# Patient Record
Sex: FEMALE | Race: WHITE | NOT HISPANIC OR LATINO | Employment: OTHER | URBAN - METROPOLITAN AREA
[De-identification: names, ages, dates, MRNs, and addresses within clinical notes are randomized per-mention and may not be internally consistent; named-entity substitution may affect disease eponyms.]

---

## 2023-03-31 ENCOUNTER — OFFICE VISIT (OUTPATIENT)
Dept: PRIMARY CARE CLINIC | Facility: CLINIC | Age: 78
End: 2023-03-31
Payer: MEDICARE

## 2023-03-31 VITALS
WEIGHT: 108.69 LBS | OXYGEN SATURATION: 99 % | SYSTOLIC BLOOD PRESSURE: 104 MMHG | HEIGHT: 61 IN | HEART RATE: 81 BPM | BODY MASS INDEX: 20.52 KG/M2 | RESPIRATION RATE: 18 BRPM | DIASTOLIC BLOOD PRESSURE: 82 MMHG

## 2023-03-31 DIAGNOSIS — E03.9 HYPOTHYROIDISM, UNSPECIFIED TYPE: ICD-10-CM

## 2023-03-31 DIAGNOSIS — I73.9 PERIPHERAL VASCULAR DISEASE, UNSPECIFIED: ICD-10-CM

## 2023-03-31 DIAGNOSIS — I25.10 CORONARY ARTERY DISEASE INVOLVING NATIVE CORONARY ARTERY OF NATIVE HEART WITHOUT ANGINA PECTORIS: Primary | ICD-10-CM

## 2023-03-31 DIAGNOSIS — Z12.11 COLON CANCER SCREENING: ICD-10-CM

## 2023-03-31 DIAGNOSIS — G47.00 INSOMNIA, UNSPECIFIED TYPE: ICD-10-CM

## 2023-03-31 DIAGNOSIS — E78.01 FAMILIAL HYPERCHOLESTEROLEMIA: ICD-10-CM

## 2023-03-31 DIAGNOSIS — I10 ESSENTIAL HYPERTENSION: ICD-10-CM

## 2023-03-31 DIAGNOSIS — D69.2 OTHER NONTHROMBOCYTOPENIC PURPURA: ICD-10-CM

## 2023-03-31 PROBLEM — E78.49 FAMILIAL HYPERLIPIDEMIA: Status: ACTIVE | Noted: 2023-03-31

## 2023-03-31 PROBLEM — E78.49 FAMILIAL HYPERLIPIDEMIA: Status: RESOLVED | Noted: 2023-03-31 | Resolved: 2023-03-31

## 2023-03-31 PROCEDURE — 1160F PR REVIEW ALL MEDS BY PRESCRIBER/CLIN PHARMACIST DOCUMENTED: ICD-10-PCS | Mod: CPTII,S$GLB,, | Performed by: FAMILY MEDICINE

## 2023-03-31 PROCEDURE — 3079F DIAST BP 80-89 MM HG: CPT | Mod: CPTII,S$GLB,, | Performed by: FAMILY MEDICINE

## 2023-03-31 PROCEDURE — 99999 PR PBB SHADOW E&M-EST. PATIENT-LVL IV: ICD-10-PCS | Mod: PBBFAC,,, | Performed by: FAMILY MEDICINE

## 2023-03-31 PROCEDURE — 1160F RVW MEDS BY RX/DR IN RCRD: CPT | Mod: CPTII,S$GLB,, | Performed by: FAMILY MEDICINE

## 2023-03-31 PROCEDURE — 1100F PTFALLS ASSESS-DOCD GE2>/YR: CPT | Mod: CPTII,S$GLB,, | Performed by: FAMILY MEDICINE

## 2023-03-31 PROCEDURE — 3079F PR MOST RECENT DIASTOLIC BLOOD PRESSURE 80-89 MM HG: ICD-10-PCS | Mod: CPTII,S$GLB,, | Performed by: FAMILY MEDICINE

## 2023-03-31 PROCEDURE — 99499 UNLISTED E&M SERVICE: CPT | Mod: HCNC,S$GLB,, | Performed by: FAMILY MEDICINE

## 2023-03-31 PROCEDURE — 99204 PR OFFICE/OUTPT VISIT, NEW, LEVL IV, 45-59 MIN: ICD-10-PCS | Mod: S$GLB,,, | Performed by: FAMILY MEDICINE

## 2023-03-31 PROCEDURE — 99204 OFFICE O/P NEW MOD 45 MIN: CPT | Mod: S$GLB,,, | Performed by: FAMILY MEDICINE

## 2023-03-31 PROCEDURE — 3288F PR FALLS RISK ASSESSMENT DOCUMENTED: ICD-10-PCS | Mod: CPTII,S$GLB,, | Performed by: FAMILY MEDICINE

## 2023-03-31 PROCEDURE — 3074F PR MOST RECENT SYSTOLIC BLOOD PRESSURE < 130 MM HG: ICD-10-PCS | Mod: CPTII,S$GLB,, | Performed by: FAMILY MEDICINE

## 2023-03-31 PROCEDURE — 1100F PR PT FALLS ASSESS DOC 2+ FALLS/FALL W/INJURY/YR: ICD-10-PCS | Mod: CPTII,S$GLB,, | Performed by: FAMILY MEDICINE

## 2023-03-31 PROCEDURE — 1126F AMNT PAIN NOTED NONE PRSNT: CPT | Mod: CPTII,S$GLB,, | Performed by: FAMILY MEDICINE

## 2023-03-31 PROCEDURE — 1159F MED LIST DOCD IN RCRD: CPT | Mod: CPTII,S$GLB,, | Performed by: FAMILY MEDICINE

## 2023-03-31 PROCEDURE — 3288F FALL RISK ASSESSMENT DOCD: CPT | Mod: CPTII,S$GLB,, | Performed by: FAMILY MEDICINE

## 2023-03-31 PROCEDURE — 1159F PR MEDICATION LIST DOCUMENTED IN MEDICAL RECORD: ICD-10-PCS | Mod: CPTII,S$GLB,, | Performed by: FAMILY MEDICINE

## 2023-03-31 PROCEDURE — 99999 PR PBB SHADOW E&M-EST. PATIENT-LVL IV: CPT | Mod: PBBFAC,,, | Performed by: FAMILY MEDICINE

## 2023-03-31 PROCEDURE — 3074F SYST BP LT 130 MM HG: CPT | Mod: CPTII,S$GLB,, | Performed by: FAMILY MEDICINE

## 2023-03-31 PROCEDURE — 1126F PR PAIN SEVERITY QUANTIFIED, NO PAIN PRESENT: ICD-10-PCS | Mod: CPTII,S$GLB,, | Performed by: FAMILY MEDICINE

## 2023-03-31 RX ORDER — ALPRAZOLAM 0.5 MG/1
0.25 TABLET ORAL NIGHTLY PRN
Qty: 20 TABLET | Refills: 2 | Status: SHIPPED | OUTPATIENT
Start: 2023-03-31 | End: 2023-06-30

## 2023-03-31 NOTE — ASSESSMENT & PLAN NOTE
Previous diagnosis found in chart.  Patient denies any problems or active symptoms.  No claudication.  Records have been requested.

## 2023-03-31 NOTE — PROGRESS NOTES
THIS DOCUMENT WAS MADE IN PART WITH VOICE RECOGNITION SOFTWARE.  OCCASIONALLY THIS SOFTWARE WILL MISINTERPRET WORDS OR PHRASES.      Primary Care Provider Appointment   [facility] [place]       Patient ID: Medina Hameed is a 77 y.o. female.    ASSESSMENT/PLAN by Problem List:  1. Coronary artery disease involving native coronary artery of native heart without angina pectoris  Overview:  She reports history of coronary artery disease, status post stent placement.  She follows closely with Dr. Harry Madrigal.  She denies any angina or acute symptoms.      2. Essential hypertension  Overview:  Chronic condition appears stable and well controlled.  Currently she is on:  Amlodipine 10 mg daily  Triamterene hydrochlorothiazide 75-50 mg, 1/2 tablet daily    Blood pressure today was slightly low but she denies any lightheadedness or dizziness is states pressure normally runs around 112-120 systolic.  She will continue to monitor.    Even though she is on a potassium-sparing diuretic apparently she still requires supplemental potassium.  We are requesting records.      3. Other nonthrombocytopenic purpura  Overview:  Patient does have very easy bruising on the extremities upper and lower.  She is on aspirin which is exacerbating this.  No recent labs.  Will request and if needed add a CBC to check platelet count.      4. Peripheral vascular disease, unspecified  Assessment & Plan:  Previous diagnosis found in chart.  Patient denies any problems or active symptoms.  No claudication.  Records have been requested.      5. Hypothyroidism, unspecified type  Overview:  Patient is currently on levothyroxine 88 mcg daily.  No recent labs, will request labs and order additional if needed      6. Familial hypercholesterolemia  Overview:  History of cholesterol levels approaching 400.  She is currently on Repatha and Zetia.  Apparently she did not do well on rosuvastatin      7. Insomnia, unspecified  type  Overview:  Patient reports a longstanding history of insomnia for which she takes alprazolam 5 mg.  Although recently she has only been taking half a tablet because she could no longer get a prescription as she has not had a PCP in a while.  Discussed benzodiazepine risk and use over time.  I would like for her to continue try to work on cutting back.  She agrees to remain on 1/2 tablet and continue to try to cut back further over time.      8. Colon cancer screening  Overview:  She reports she had a colonoscopy in January of 2022.  She states it was clear and was told she did not need to repeat this again.      Other orders  -     ALPRAZolam (XANAX) 0.5 MG tablet; Take 0.5 tablets (0.25 mg total) by mouth nightly as needed for Anxiety.  Dispense: 20 tablet; Refill: 2        Follow Up:Three months    Subjective:     Chief Complaint   Patient presents with    Establish Care     I have reviewed the information entered by the ancillary staff regarding the chief complaint as well as the related history.    HPI    Patient is a/an 77 y.o.  female     Establish care.  Limited history but reviewed as much as possible and discussed with her.  We are also requesting records from her cardiologist who has been managing most of her care.  She is not seen a PCP in awhile.  See above for details addressed today    For complete problem list, past medical history, surgical history, social history, etc., see appropriate section in the electronic medical record    Review of Systems   HENT: Negative.     Respiratory: Negative.     Cardiovascular: Negative.    Gastrointestinal: Negative.    Genitourinary: Negative.    Musculoskeletal: Negative.    Psychiatric/Behavioral:  Positive for sleep disturbance. The patient is nervous/anxious.      Objective     Physical Exam  Vitals reviewed.   Constitutional:       General: She is not in acute distress.     Appearance: She is well-developed. She is not toxic-appearing.   HENT:      Head:  "Normocephalic and atraumatic.      Right Ear: Tympanic membrane, ear canal and external ear normal. There is no impacted cerumen.      Left Ear: Tympanic membrane, ear canal and external ear normal. There is no impacted cerumen.      Nose: Nose normal.      Mouth/Throat:      Pharynx: Oropharynx is clear. No oropharyngeal exudate.   Eyes:      General: No scleral icterus.     Conjunctiva/sclera: Conjunctivae normal.      Pupils: Pupils are equal, round, and reactive to light.   Neck:      Thyroid: No thyromegaly.      Vascular: No JVD.      Trachea: No tracheal deviation.   Cardiovascular:      Rate and Rhythm: Normal rate and regular rhythm.      Chest Wall: PMI is not displaced.      Heart sounds: Normal heart sounds. No murmur heard.  Pulmonary:      Effort: No respiratory distress.      Breath sounds: Normal breath sounds. No wheezing or rales.   Abdominal:      Palpations: Abdomen is soft.   Musculoskeletal:      Cervical back: Normal range of motion and neck supple.   Lymphadenopathy:      Cervical: No cervical adenopathy.   Skin:     Coloration: Skin is not jaundiced or pale.   Neurological:      Mental Status: She is alert and oriented to person, place, and time.      Cranial Nerves: No cranial nerve deficit.      Motor: No abnormal muscle tone.      Deep Tendon Reflexes: Reflexes normal.   Psychiatric:         Behavior: Behavior normal.     Vitals:    03/31/23 0754   BP: 104/82   BP Location: Left arm   Patient Position: Sitting   BP Method: Medium (Manual)   Pulse: 81   Resp: 18   SpO2: 99%   Weight: 49.3 kg (108 lb 11 oz)   Height: 5' 1" (1.549 m)       "

## 2023-06-30 ENCOUNTER — OFFICE VISIT (OUTPATIENT)
Dept: PRIMARY CARE CLINIC | Facility: CLINIC | Age: 78
End: 2023-06-30
Payer: MEDICARE

## 2023-06-30 VITALS
SYSTOLIC BLOOD PRESSURE: 112 MMHG | HEIGHT: 57 IN | BODY MASS INDEX: 23.71 KG/M2 | DIASTOLIC BLOOD PRESSURE: 70 MMHG | TEMPERATURE: 98 F | OXYGEN SATURATION: 97 % | WEIGHT: 109.88 LBS | HEART RATE: 64 BPM | RESPIRATION RATE: 18 BRPM

## 2023-06-30 DIAGNOSIS — G47.00 INSOMNIA, UNSPECIFIED TYPE: ICD-10-CM

## 2023-06-30 PROCEDURE — 3074F SYST BP LT 130 MM HG: CPT | Mod: CPTII,S$GLB,, | Performed by: FAMILY MEDICINE

## 2023-06-30 PROCEDURE — 1159F MED LIST DOCD IN RCRD: CPT | Mod: CPTII,S$GLB,, | Performed by: FAMILY MEDICINE

## 2023-06-30 PROCEDURE — 3078F PR MOST RECENT DIASTOLIC BLOOD PRESSURE < 80 MM HG: ICD-10-PCS | Mod: CPTII,S$GLB,, | Performed by: FAMILY MEDICINE

## 2023-06-30 PROCEDURE — 1126F AMNT PAIN NOTED NONE PRSNT: CPT | Mod: CPTII,S$GLB,, | Performed by: FAMILY MEDICINE

## 2023-06-30 PROCEDURE — 1160F PR REVIEW ALL MEDS BY PRESCRIBER/CLIN PHARMACIST DOCUMENTED: ICD-10-PCS | Mod: CPTII,S$GLB,, | Performed by: FAMILY MEDICINE

## 2023-06-30 PROCEDURE — 3288F FALL RISK ASSESSMENT DOCD: CPT | Mod: CPTII,S$GLB,, | Performed by: FAMILY MEDICINE

## 2023-06-30 PROCEDURE — 1126F PR PAIN SEVERITY QUANTIFIED, NO PAIN PRESENT: ICD-10-PCS | Mod: CPTII,S$GLB,, | Performed by: FAMILY MEDICINE

## 2023-06-30 PROCEDURE — 1159F PR MEDICATION LIST DOCUMENTED IN MEDICAL RECORD: ICD-10-PCS | Mod: CPTII,S$GLB,, | Performed by: FAMILY MEDICINE

## 2023-06-30 PROCEDURE — 3288F PR FALLS RISK ASSESSMENT DOCUMENTED: ICD-10-PCS | Mod: CPTII,S$GLB,, | Performed by: FAMILY MEDICINE

## 2023-06-30 PROCEDURE — 99214 PR OFFICE/OUTPT VISIT, EST, LEVL IV, 30-39 MIN: ICD-10-PCS | Mod: S$GLB,,, | Performed by: FAMILY MEDICINE

## 2023-06-30 PROCEDURE — 1160F RVW MEDS BY RX/DR IN RCRD: CPT | Mod: CPTII,S$GLB,, | Performed by: FAMILY MEDICINE

## 2023-06-30 PROCEDURE — 1101F PR PT FALLS ASSESS DOC 0-1 FALLS W/OUT INJ PAST YR: ICD-10-PCS | Mod: CPTII,S$GLB,, | Performed by: FAMILY MEDICINE

## 2023-06-30 PROCEDURE — 3074F PR MOST RECENT SYSTOLIC BLOOD PRESSURE < 130 MM HG: ICD-10-PCS | Mod: CPTII,S$GLB,, | Performed by: FAMILY MEDICINE

## 2023-06-30 PROCEDURE — 3078F DIAST BP <80 MM HG: CPT | Mod: CPTII,S$GLB,, | Performed by: FAMILY MEDICINE

## 2023-06-30 PROCEDURE — 99999 PR PBB SHADOW E&M-EST. PATIENT-LVL V: ICD-10-PCS | Mod: PBBFAC,,, | Performed by: FAMILY MEDICINE

## 2023-06-30 PROCEDURE — 1101F PT FALLS ASSESS-DOCD LE1/YR: CPT | Mod: CPTII,S$GLB,, | Performed by: FAMILY MEDICINE

## 2023-06-30 PROCEDURE — 99999 PR PBB SHADOW E&M-EST. PATIENT-LVL V: CPT | Mod: PBBFAC,,, | Performed by: FAMILY MEDICINE

## 2023-06-30 PROCEDURE — 99214 OFFICE O/P EST MOD 30 MIN: CPT | Mod: S$GLB,,, | Performed by: FAMILY MEDICINE

## 2023-06-30 RX ORDER — TRIAMTERENE/HYDROCHLOROTHIAZID 37.5-25 MG
1 TABLET ORAL DAILY
COMMUNITY

## 2023-06-30 RX ORDER — L-METHYLFOLATE-ALGAE-VIT B12-B6 CAP 3-90.314-2-35 MG 3-90.314-2-35 MG
CAP ORAL
COMMUNITY
Start: 2023-03-30

## 2023-06-30 RX ORDER — ALPRAZOLAM 0.5 MG/1
0.5 TABLET ORAL NIGHTLY PRN
Qty: 30 TABLET | Refills: 3 | Status: SHIPPED | OUTPATIENT
Start: 2023-06-30 | End: 2023-10-02 | Stop reason: SDUPTHER

## 2023-06-30 RX ORDER — METOPROLOL SUCCINATE 25 MG/1
TABLET, EXTENDED RELEASE ORAL
COMMUNITY

## 2023-06-30 NOTE — PROGRESS NOTES
Primary Care Provider Appointment   [facility] [place]       Patient ID: Medina Hameed is a 77 y.o. female.    ASSESSMENT/PLAN by Problem List:    1. Insomnia, unspecified type  Overview:  Patient reports a longstanding history of insomnia for which she takes alprazolam 0.5 mg.  Although recently she has only been taking half a tablet because she could no longer get a prescription as she has not had a PCP in a while.  Discussed benzodiazepine risk and use over time.  I had recommended that she reduce to 1/2 tablet.  She has tried this but states she is not sleeping well and really wants to go back to a full 0.5 mg tablet.  New prescription given.  She will follow back in 3-4 months.      Other orders  -     ALPRAZolam (XANAX) 0.5 MG tablet; Take 1 tablet (0.5 mg total) by mouth nightly as needed for Anxiety.  Dispense: 30 tablet; Refill: 3        Follow Up:  3-4 months    Subjective:     Chief Complaint   Patient presents with    Follow-up     I have reviewed the information entered by the ancillary staff regarding the chief complaint as well as the related history.    HPI    Patient is a/an 77 y.o.  female       Recent angiogram, all normal, med changes, feels better but not sleeping well  Not sleeping with reduce xanax dose  Muscle tightness in neck    For complete problem list, past medical history, surgical history, social history, etc., see appropriate section in the electronic medical record    Review of Systems   Respiratory: Negative.     Cardiovascular: Negative.    Gastrointestinal: Negative.    Genitourinary: Negative.    Musculoskeletal:  Positive for neck stiffness.   Psychiatric/Behavioral:  Positive for sleep disturbance. Negative for dysphoric mood.      Objective     Physical Exam  Vitals reviewed.   Constitutional:       General: She is not in acute distress.     Appearance: She is well-developed. She is not diaphoretic.   HENT:      Head: Normocephalic and  "atraumatic.   Eyes:      General: No scleral icterus.  Pulmonary:      Effort: Pulmonary effort is normal. No respiratory distress.   Neurological:      Mental Status: She is alert and oriented to person, place, and time.   Psychiatric:         Mood and Affect: Mood normal.         Behavior: Behavior normal.     Vitals:    06/30/23 0915   BP: 112/70   BP Location: Left arm   Patient Position: Sitting   BP Method: Medium (Manual)   Pulse: 64   Resp: 18   Temp: 98.4 °F (36.9 °C)   TempSrc: Oral   SpO2: 97%   Weight: 49.9 kg (109 lb 14.4 oz)   Height: 4' 9" (1.448 m)       "

## 2023-10-02 ENCOUNTER — OFFICE VISIT (OUTPATIENT)
Dept: PRIMARY CARE CLINIC | Facility: CLINIC | Age: 78
End: 2023-10-02
Payer: MEDICARE

## 2023-10-02 VITALS
DIASTOLIC BLOOD PRESSURE: 70 MMHG | BODY MASS INDEX: 23.95 KG/M2 | HEIGHT: 57 IN | SYSTOLIC BLOOD PRESSURE: 112 MMHG | RESPIRATION RATE: 18 BRPM | HEART RATE: 59 BPM | OXYGEN SATURATION: 98 % | TEMPERATURE: 99 F | WEIGHT: 111 LBS

## 2023-10-02 DIAGNOSIS — Z78.0 POSTMENOPAUSAL: ICD-10-CM

## 2023-10-02 DIAGNOSIS — G47.00 INSOMNIA, UNSPECIFIED TYPE: ICD-10-CM

## 2023-10-02 DIAGNOSIS — I25.10 CORONARY ARTERY DISEASE INVOLVING NATIVE CORONARY ARTERY OF NATIVE HEART WITHOUT ANGINA PECTORIS: ICD-10-CM

## 2023-10-02 DIAGNOSIS — I10 ESSENTIAL HYPERTENSION: Primary | ICD-10-CM

## 2023-10-02 PROCEDURE — 1159F PR MEDICATION LIST DOCUMENTED IN MEDICAL RECORD: ICD-10-PCS | Mod: HCNC,CPTII,S$GLB, | Performed by: FAMILY MEDICINE

## 2023-10-02 PROCEDURE — 3074F SYST BP LT 130 MM HG: CPT | Mod: HCNC,CPTII,S$GLB, | Performed by: FAMILY MEDICINE

## 2023-10-02 PROCEDURE — 1160F PR REVIEW ALL MEDS BY PRESCRIBER/CLIN PHARMACIST DOCUMENTED: ICD-10-PCS | Mod: HCNC,CPTII,S$GLB, | Performed by: FAMILY MEDICINE

## 2023-10-02 PROCEDURE — 1160F RVW MEDS BY RX/DR IN RCRD: CPT | Mod: HCNC,CPTII,S$GLB, | Performed by: FAMILY MEDICINE

## 2023-10-02 PROCEDURE — 1126F AMNT PAIN NOTED NONE PRSNT: CPT | Mod: HCNC,CPTII,S$GLB, | Performed by: FAMILY MEDICINE

## 2023-10-02 PROCEDURE — 1126F PR PAIN SEVERITY QUANTIFIED, NO PAIN PRESENT: ICD-10-PCS | Mod: HCNC,CPTII,S$GLB, | Performed by: FAMILY MEDICINE

## 2023-10-02 PROCEDURE — 3078F PR MOST RECENT DIASTOLIC BLOOD PRESSURE < 80 MM HG: ICD-10-PCS | Mod: HCNC,CPTII,S$GLB, | Performed by: FAMILY MEDICINE

## 2023-10-02 PROCEDURE — 3288F FALL RISK ASSESSMENT DOCD: CPT | Mod: HCNC,CPTII,S$GLB, | Performed by: FAMILY MEDICINE

## 2023-10-02 PROCEDURE — 1101F PR PT FALLS ASSESS DOC 0-1 FALLS W/OUT INJ PAST YR: ICD-10-PCS | Mod: HCNC,CPTII,S$GLB, | Performed by: FAMILY MEDICINE

## 2023-10-02 PROCEDURE — 99214 OFFICE O/P EST MOD 30 MIN: CPT | Mod: HCNC,S$GLB,, | Performed by: FAMILY MEDICINE

## 2023-10-02 PROCEDURE — 3078F DIAST BP <80 MM HG: CPT | Mod: HCNC,CPTII,S$GLB, | Performed by: FAMILY MEDICINE

## 2023-10-02 PROCEDURE — 3288F PR FALLS RISK ASSESSMENT DOCUMENTED: ICD-10-PCS | Mod: HCNC,CPTII,S$GLB, | Performed by: FAMILY MEDICINE

## 2023-10-02 PROCEDURE — 1101F PT FALLS ASSESS-DOCD LE1/YR: CPT | Mod: HCNC,CPTII,S$GLB, | Performed by: FAMILY MEDICINE

## 2023-10-02 PROCEDURE — 99214 PR OFFICE/OUTPT VISIT, EST, LEVL IV, 30-39 MIN: ICD-10-PCS | Mod: HCNC,S$GLB,, | Performed by: FAMILY MEDICINE

## 2023-10-02 PROCEDURE — 1158F PR ADVANCE CARE PLANNING DISCUSS DOCUMENTED IN MEDICAL RECORD: ICD-10-PCS | Mod: HCNC,CPTII,S$GLB, | Performed by: FAMILY MEDICINE

## 2023-10-02 PROCEDURE — 99999 PR PBB SHADOW E&M-EST. PATIENT-LVL V: ICD-10-PCS | Mod: PBBFAC,HCNC,, | Performed by: FAMILY MEDICINE

## 2023-10-02 PROCEDURE — 3074F PR MOST RECENT SYSTOLIC BLOOD PRESSURE < 130 MM HG: ICD-10-PCS | Mod: HCNC,CPTII,S$GLB, | Performed by: FAMILY MEDICINE

## 2023-10-02 PROCEDURE — 99999 PR PBB SHADOW E&M-EST. PATIENT-LVL V: CPT | Mod: PBBFAC,HCNC,, | Performed by: FAMILY MEDICINE

## 2023-10-02 PROCEDURE — 1159F MED LIST DOCD IN RCRD: CPT | Mod: HCNC,CPTII,S$GLB, | Performed by: FAMILY MEDICINE

## 2023-10-02 PROCEDURE — 1158F ADVNC CARE PLAN TLK DOCD: CPT | Mod: HCNC,CPTII,S$GLB, | Performed by: FAMILY MEDICINE

## 2023-10-02 RX ORDER — ALPRAZOLAM 0.5 MG/1
0.5 TABLET ORAL NIGHTLY PRN
Qty: 30 TABLET | Refills: 3 | Status: SHIPPED | OUTPATIENT
Start: 2023-10-02 | End: 2024-01-11 | Stop reason: SDUPTHER

## 2023-10-02 NOTE — PROGRESS NOTES
Primary Care Provider Appointment   [facility] [place]       Patient ID: Medina Hameed is a 77 y.o. female.    ASSESSMENT/PLAN by Problem List:    1. Essential hypertension  Overview:  Chronic condition appears stable and well controlled.  Currently she is on:  Amlodipine 10 mg daily  Triamterene hydrochlorothiazide 75-50 mg, 1/2 tablet daily    Blood pressure today was slightly low but she denies any lightheadedness or dizziness is states pressure normally runs around 112-120 systolic.  She will continue to monitor.    Even though she is on a potassium-sparing diuretic apparently she still requires supplemental potassium.  We are requesting records.           Follow Up:  Three months    Health Maintenance         Date Due Completion Date    DEXA Scan Never done ---    COVID-19 Vaccine (6 - Mixed Product series) 03/09/2023 1/12/2023    Influenza Vaccine (1) 09/01/2023 10/24/2022    Lipid Panel 03/01/2028 3/1/2023    TETANUS VACCINE 05/03/2029 5/3/2019        Discussed recommended vaccinations.  They will think about this.    Advance Care Planning     Date: 10/02/2023    Living Will  Power of   I initiated the process of voluntary advance care planning today and explained the importance of this process to the patient.  I introduced the concept of advance directives to the patient, as well. Then the patient received detailed information about the importance of designating a Health Care Power of  (HCPOA). She was also instructed to communicate with this person about their wishes for future healthcare, should she become sick and lose decision-making capacity. The patient has not previously appointed a HCPOA. After our discussion, the patient has decided to complete a HCPOA and has appointed her  she will consider, complete and return documents. ,     Patient was given information and counseled regarding advanced care planning including living will and healthcare power of  .  I encouraged the patient to review the information, discuss with their family, complete when ready or return with additional questions if needed.               Subjective:     Chief Complaint   Patient presents with    Follow-up     I have reviewed the information entered by the ancillary staff regarding the chief complaint as well as the related history.    HPI    Patient is a/an 77 y.o.  female     Routine follow-up.  She is been doing well.  She states she recently had an angiogram in June and this was apparently stable.  She is been more active recently and feels well.  She voices no acute concerns.  See above for details addressed today.    For complete problem list, past medical history, surgical history, social history, etc., see appropriate section in the electronic medical record    Review of Systems   Constitutional:  Negative for activity change and unexpected weight change.   Respiratory: Negative.  Negative for shortness of breath.    Cardiovascular: Negative.  Negative for chest pain.   Gastrointestinal: Negative.    Musculoskeletal: Negative.    Neurological: Negative.        Objective     Physical Exam  Vitals reviewed.   Constitutional:       General: She is not in acute distress.     Appearance: She is well-developed. She is not ill-appearing.   HENT:      Head: Normocephalic and atraumatic.   Eyes:      General: No scleral icterus.     Conjunctiva/sclera: Conjunctivae normal.   Cardiovascular:      Rate and Rhythm: Normal rate and regular rhythm.      Heart sounds: Normal heart sounds. No murmur heard.  Pulmonary:      Effort: Pulmonary effort is normal. No respiratory distress.      Breath sounds: Normal breath sounds. No wheezing or rales.   Skin:     General: Skin is dry.      Findings: No rash.   Neurological:      Mental Status: She is alert and oriented to person, place, and time.   Psychiatric:         Behavior: Behavior normal.       Vitals:    10/02/23 0918   BP: 112/70   BP  "Location: Left arm   Patient Position: Sitting   BP Method: Medium (Manual)   Pulse: (!) 59   Resp: 18   Temp: 98.5 °F (36.9 °C)   TempSrc: Oral   SpO2: 98%   Weight: 50.4 kg (111 lb)   Height: 4' 9" (1.448 m)           THIS DOCUMENT WAS MADE IN PART WITH VOICE RECOGNITION SOFTWARE.  OCCASIONALLY THIS SOFTWARE WILL MISINTERPRET WORDS OR PHRASES.    "

## 2023-10-23 ENCOUNTER — TELEPHONE (OUTPATIENT)
Dept: PRIMARY CARE CLINIC | Facility: CLINIC | Age: 78
End: 2023-10-23
Payer: MEDICARE

## 2023-10-23 RX ORDER — BENZONATATE 100 MG/1
200 CAPSULE ORAL 3 TIMES DAILY PRN
Qty: 40 CAPSULE | Refills: 1 | Status: SHIPPED | OUTPATIENT
Start: 2023-10-23

## 2023-10-23 NOTE — TELEPHONE ENCOUNTER
----- Message from Ayana Tejeda sent at 10/21/2023  9:41 AM CDT -----  Type:  Needs Medical Advice       Who Called:     Symptoms (please be specific): COVID     How long has patient had these symptoms: 12 hours     Pharmacy name and phone #: [facility] [address]   Phone:  [phone]  Fax:  [phone]        Would the patient rather a call back or a response via [facility]? Call back     Best Call Back Number: [phone]    Additional Information: Patient was around her  who had covid would like to know if she can have medication molnupiravir 200 mg called to her pharmacy.

## 2023-10-23 NOTE — TELEPHONE ENCOUNTER
I sent in a prescription for molnupiravir, since she already started this, of her own accord and not under our guidance, it would not be appropriate to switch to Paxlovid at this point.

## 2023-10-23 NOTE — TELEPHONE ENCOUNTER
Spoke with pt's , Domenic, and pt has COVID since Saturday.   Requesting a prescription of Molnupiravir because she took her 's medication and now he is out.  Also requesting a refill of Tessalon Perles.     [facility] pharmacy

## 2023-10-23 NOTE — TELEPHONE ENCOUNTER
"Spoke with pt, she reports that she will not be taking "that experimental drug."  Pt reports that she has a sore throat.  Encouraged pt to check for white pockets in her throat and to let us know if she worsens.  Pt encouraged to take an OTC allergy pill and to drink some hot tea with honey and lemon.  Pt verbalized understanding.   "

## 2023-10-24 ENCOUNTER — TELEPHONE (OUTPATIENT)
Dept: PRIMARY CARE CLINIC | Facility: CLINIC | Age: 78
End: 2023-10-24
Payer: MEDICARE

## 2023-10-24 ENCOUNTER — CLINICAL SUPPORT (OUTPATIENT)
Dept: PRIMARY CARE CLINIC | Facility: CLINIC | Age: 78
End: 2023-10-24
Payer: MEDICARE

## 2023-10-24 VITALS
OXYGEN SATURATION: 99 % | HEART RATE: 70 BPM | DIASTOLIC BLOOD PRESSURE: 82 MMHG | RESPIRATION RATE: 17 BRPM | SYSTOLIC BLOOD PRESSURE: 124 MMHG | TEMPERATURE: 99 F

## 2023-10-24 DIAGNOSIS — J02.9 PHARYNGITIS, UNSPECIFIED ETIOLOGY: Primary | ICD-10-CM

## 2023-10-24 DIAGNOSIS — U07.1 COVID-19 VIRUS DETECTED: ICD-10-CM

## 2023-10-24 LAB
CTP QC/QA: YES
CTP QC/QA: YES
S PYO RRNA THROAT QL PROBE: NEGATIVE
SARS-COV-2 RDRP RESP QL NAA+PROBE: POSITIVE

## 2023-10-24 PROCEDURE — 87635: ICD-10-PCS | Mod: QW,HCNC,S$GLB, | Performed by: FAMILY MEDICINE

## 2023-10-24 PROCEDURE — 87880 STREP A ASSAY W/OPTIC: CPT | Mod: QW,HCNC,S$GLB, | Performed by: FAMILY MEDICINE

## 2023-10-24 PROCEDURE — 87880 POCT RAPID STREP A: ICD-10-PCS | Mod: QW,HCNC,S$GLB, | Performed by: FAMILY MEDICINE

## 2023-10-24 PROCEDURE — 99999 PR PBB SHADOW E&M-EST. PATIENT-LVL III: ICD-10-PCS | Mod: PBBFAC,HCNC,,

## 2023-10-24 PROCEDURE — 99999 PR PBB SHADOW E&M-EST. PATIENT-LVL III: CPT | Mod: PBBFAC,HCNC,,

## 2023-10-24 PROCEDURE — 87635 SARS-COV-2 COVID-19 AMP PRB: CPT | Mod: QW,HCNC,S$GLB, | Performed by: FAMILY MEDICINE

## 2023-10-24 RX ORDER — METHYLPREDNISOLONE 4 MG/1
TABLET ORAL
Qty: 21 TABLET | Refills: 0 | Status: SHIPPED | OUTPATIENT
Start: 2023-10-24 | End: 2024-01-11

## 2023-10-24 RX ORDER — UBIDECARENONE 30 MG
400 CAPSULE ORAL DAILY
COMMUNITY

## 2023-10-24 NOTE — TELEPHONE ENCOUNTER
----- Message from Judith Barrera sent at 10/24/2023  8:52 AM CDT -----  Regarding: step throat  [phone] - call back ; she thinks she have stepthroat instead of Covid; can't swallow a pill it hurts so bad    Judith

## 2024-01-11 ENCOUNTER — OFFICE VISIT (OUTPATIENT)
Dept: PRIMARY CARE CLINIC | Facility: CLINIC | Age: 79
End: 2024-01-11
Payer: MEDICARE

## 2024-01-11 VITALS
TEMPERATURE: 98 F | OXYGEN SATURATION: 97 % | HEIGHT: 57 IN | BODY MASS INDEX: 23.6 KG/M2 | WEIGHT: 109.38 LBS | SYSTOLIC BLOOD PRESSURE: 122 MMHG | DIASTOLIC BLOOD PRESSURE: 70 MMHG | RESPIRATION RATE: 18 BRPM | HEART RATE: 64 BPM

## 2024-01-11 DIAGNOSIS — I10 ESSENTIAL HYPERTENSION: ICD-10-CM

## 2024-01-11 DIAGNOSIS — D69.2 OTHER NONTHROMBOCYTOPENIC PURPURA: ICD-10-CM

## 2024-01-11 DIAGNOSIS — I73.9 PERIPHERAL VASCULAR DISEASE, UNSPECIFIED: ICD-10-CM

## 2024-01-11 DIAGNOSIS — G47.00 INSOMNIA, UNSPECIFIED TYPE: ICD-10-CM

## 2024-01-11 DIAGNOSIS — Z00.00 ENCOUNTER FOR MEDICARE ANNUAL WELLNESS EXAM: ICD-10-CM

## 2024-01-11 DIAGNOSIS — Z78.0 POSTMENOPAUSAL: ICD-10-CM

## 2024-01-11 DIAGNOSIS — E78.01 FAMILIAL HYPERCHOLESTEROLEMIA: ICD-10-CM

## 2024-01-11 DIAGNOSIS — E03.9 HYPOTHYROIDISM, UNSPECIFIED TYPE: ICD-10-CM

## 2024-01-11 DIAGNOSIS — I25.10 CORONARY ARTERY DISEASE INVOLVING NATIVE CORONARY ARTERY OF NATIVE HEART WITHOUT ANGINA PECTORIS: Primary | ICD-10-CM

## 2024-01-11 PROCEDURE — 99999 PR PBB SHADOW E&M-EST. PATIENT-LVL V: CPT | Mod: PBBFAC,HCNC,, | Performed by: FAMILY MEDICINE

## 2024-01-11 PROCEDURE — 3074F SYST BP LT 130 MM HG: CPT | Mod: HCNC,CPTII,S$GLB, | Performed by: FAMILY MEDICINE

## 2024-01-11 PROCEDURE — 1159F MED LIST DOCD IN RCRD: CPT | Mod: HCNC,CPTII,S$GLB, | Performed by: FAMILY MEDICINE

## 2024-01-11 PROCEDURE — 1160F RVW MEDS BY RX/DR IN RCRD: CPT | Mod: HCNC,CPTII,S$GLB, | Performed by: FAMILY MEDICINE

## 2024-01-11 PROCEDURE — 3288F FALL RISK ASSESSMENT DOCD: CPT | Mod: HCNC,CPTII,S$GLB, | Performed by: FAMILY MEDICINE

## 2024-01-11 PROCEDURE — 99214 OFFICE O/P EST MOD 30 MIN: CPT | Mod: HCNC,S$GLB,, | Performed by: FAMILY MEDICINE

## 2024-01-11 PROCEDURE — 1126F AMNT PAIN NOTED NONE PRSNT: CPT | Mod: HCNC,CPTII,S$GLB, | Performed by: FAMILY MEDICINE

## 2024-01-11 PROCEDURE — 3078F DIAST BP <80 MM HG: CPT | Mod: HCNC,CPTII,S$GLB, | Performed by: FAMILY MEDICINE

## 2024-01-11 PROCEDURE — 1101F PT FALLS ASSESS-DOCD LE1/YR: CPT | Mod: HCNC,CPTII,S$GLB, | Performed by: FAMILY MEDICINE

## 2024-01-11 RX ORDER — ALPRAZOLAM 0.5 MG/1
0.5 TABLET ORAL NIGHTLY PRN
Qty: 30 TABLET | Refills: 3 | Status: SHIPPED | OUTPATIENT
Start: 2024-01-11

## 2024-01-11 RX ORDER — MUPIROCIN 20 MG/G
OINTMENT TOPICAL
Qty: 30 G | Refills: 1 | Status: SHIPPED | OUTPATIENT
Start: 2024-01-11

## 2024-01-11 NOTE — PROGRESS NOTES
Primary Care Provider Appointment   [facility]       Patient ID: Medina Hameed is a 78 y.o. female.    ASSESSMENT/PLAN by Problem List:    1. Coronary artery disease involving native coronary artery of native heart without angina pectoris  Overview:  She reports history of coronary artery disease, status post stent placement.  She follows closely with Dr. Harry Madrigal.  She denies any angina or acute symptoms.  She reports she had an angiogram in 2023, summer, without any significant blockages or intervention required.  She continues to follow with Cardiology      2. Essential hypertension  Overview:  Chronic condition appears stable and well controlled.  Currently she is on:  Amlodipine 10 mg daily  Triamterene hydrochlorothiazide 75-50 mg, 1/2 tablet daily    Her blood pressure remains satisfactory, this is managed by Cardiology but she can let me      3. Familial hypercholesterolemia  Overview:  History of cholesterol levels approaching 400.  She is currently on Repatha and Zetia.  Apparently she did not do well on rosuvastatin.  Again I do not have any records, I reminded her to request records from Cardiology she says she has labs upcoming within the next month or two.      4. Hypothyroidism, unspecified type  Overview:  Patient is currently on levothyroxine 88 mcg daily.  She reports her levels have been normal and is followed by her cardiologist.  I reminded her to please send me updates when ever she has lab work      5. Postmenopausal  -     DXA Bone Density Axial Skeleton 1 or more sites; Future; Expected date: 01/11/2024    6. Other nonthrombocytopenic purpura  Overview:  She does have mild bruising on the extremities consistent with senile purpura which is exacerbated by aspirin.  No excessive bleeding.  We will monitor.  Recommend that she send me a copy of her upcoming labs including CBC.      7. Peripheral vascular disease, unspecified  Assessment & Plan:  Noted  as previously documented in her chart.  She has no limitations of activity and denies any claudication.  She is followed by Cardiology.      8. Insomnia, unspecified type  Overview:  Patient reports a longstanding history of insomnia for which she takes alprazolam 0.5 mg.  She apparently had been on this for many years before I met her.  I did encourage her to reduce the dosage last year and she is reduce to half a tablet as needed.  Would encourage periodic reflexion and reduction when possible.      Other orders  -     ALPRAZolam (XANAX) 0.5 MG tablet; Take 1 tablet (0.5 mg total) by mouth nightly as needed for Anxiety.  Dispense: 30 tablet; Refill: 3  -     mupirocin (BACTROBAN) 2 % ointment; mupirocin 2 % topical ointment  APPLY A SMALL AMOUNT TO THE AFFECTED AREA BY TOPICAL ROUTE 3 TIMES PER DAY  Dispense: 30 g; Refill: 1         Follow Up:  3-4 months    Subjective:     Chief Complaint   Patient presents with    Follow-up     I have reviewed the information entered by the ancillary staff regarding the chief complaint as well as the related history.    Follow-up        Patient is a/an 78 y.o.  female     Routine follow-up for medication management and refills.  She reports overall she has been doing well without acute concerns.  She continues to have most of her chronic medical problems managed by her cardiologist.  See above for details addressed today    For complete problem list, past medical history, surgical history, social history, etc., see appropriate section in the electronic medical record    Review of Systems   Respiratory: Negative.     Cardiovascular: Negative.    Gastrointestinal: Negative.    Genitourinary: Negative.    Musculoskeletal: Negative.    Psychiatric/Behavioral:  The patient is nervous/anxious.        Objective     Physical Exam  Vitals reviewed.   Constitutional:       General: She is not in acute distress.     Appearance: She is well-developed. She is not diaphoretic.   HENT:       "Head: Normocephalic and atraumatic.   Eyes:      General: No scleral icterus.  Cardiovascular:      Rate and Rhythm: Normal rate and regular rhythm.   Pulmonary:      Effort: Pulmonary effort is normal. No respiratory distress.   Skin:     Comments: There is mild bruising noted on the back of her arms and hands.  Consistent with senile bruising, thin skin.  There is no bruising elsewhere no petechiae.   Neurological:      Mental Status: She is alert and oriented to person, place, and time.   Psychiatric:         Mood and Affect: Mood normal.         Behavior: Behavior normal.       Vitals:    01/11/24 1310   BP: 122/70   BP Location: Left arm   Patient Position: Sitting   BP Method: Large (Manual)   Pulse: 64   Resp: 18   Temp: 98.2 °F (36.8 °C)   TempSrc: Oral   SpO2: 97%   Weight: 49.6 kg (109 lb 5.6 oz)   Height: 4' 9" (1.448 m)           THIS DOCUMENT WAS MADE IN PART WITH VOICE RECOGNITION SOFTWARE.  OCCASIONALLY THIS SOFTWARE WILL MISINTERPRET WORDS OR PHRASES.    "

## 2024-01-11 NOTE — ASSESSMENT & PLAN NOTE
Noted as previously documented in her chart.  She has no limitations of activity and denies any claudication.  She is followed by Cardiology.

## 2024-02-07 ENCOUNTER — HOSPITAL ENCOUNTER (OUTPATIENT)
Dept: RADIOLOGY | Facility: HOSPITAL | Age: 79
Discharge: HOME | End: 2024-02-07
Attending: FAMILY MEDICINE
Payer: MEDICARE

## 2024-02-07 DIAGNOSIS — Z78.0 POSTMENOPAUSAL: ICD-10-CM

## 2024-02-07 PROCEDURE — 77080 DXA BONE DENSITY AXIAL: CPT | Mod: TC,HCNC,PO

## 2024-02-07 PROCEDURE — 77080 DXA BONE DENSITY AXIAL: CPT | Mod: 26,HCNC,, | Performed by: RADIOLOGY

## 2024-02-15 ENCOUNTER — TELEPHONE (OUTPATIENT)
Dept: PRIMARY CARE CLINIC | Facility: CLINIC | Age: 79
End: 2024-02-15
Payer: MEDICARE

## 2024-02-15 NOTE — TELEPHONE ENCOUNTER
Spoke with pt, discussed your message in its entirety, pt verbalized understanding.   Pt reports that she will start taking a Calcium supplement, increase her Vitamin D3 to 2000 IU and that she is very active.  Pt reports that she does not want a sooner appt than currently scheduled appt in May.

## 2024-02-15 NOTE — TELEPHONE ENCOUNTER
Please call regarding bone density test.  Bone density does suggest osteoporosis.  This indicates that her risk of fracture is increased.  I do recommend that we consider medications that will help to reduce her risk of fracture.  She has an appointment in a few months and we can review in detail.  Although if she feels she would like to address this sooner please help her arrange a follow-up.  In the meantime it is important that she engage in regular exercise such as walking.  She should take a vitamin-D and calcium supplement I typically recommend about 2000 IU of vitamin D3.  And 1200 mg calcium

## 2024-03-07 ENCOUNTER — TELEPHONE (OUTPATIENT)
Dept: PRIMARY CARE CLINIC | Facility: CLINIC | Age: 79
End: 2024-03-07
Payer: MEDICARE

## 2024-03-07 RX ORDER — ALPRAZOLAM 0.5 MG/1
0.5 TABLET ORAL NIGHTLY PRN
Qty: 30 TABLET | Refills: 3 | Status: CANCELLED | OUTPATIENT
Start: 2024-03-07

## 2024-03-07 NOTE — TELEPHONE ENCOUNTER
----- Message from Stevo Reyes sent at 3/7/2024  4:49 PM CST -----  Contact: [phone]  Please transfer    Requesting an RX refill or new RX.  Is this a refill or new RX: refill  RX name and strength (copy/paste from chart):  ALPRAZolam (XANAX) 0.5 MG tablet   Is this a 30 day or 90 day RX:   Pharmacy name and phone # (copy/paste from chart):    [address] [place] [facility] [address]  [address]  [place] [address]  Phone: [phone] Fax: [phone]      The doctors have asked that we provide their patients with the following 2 reminders -- prescription refills can take up to 72 hours, and a friendly reminder that in the future you can use your [facility] account to request refills: yes

## 2024-03-07 NOTE — TELEPHONE ENCOUNTER
Spoke with Erica at [facility].  She reports that pt has 90 tablets left on file.  Refill will be processed for pt to p/u.

## 2024-04-17 ENCOUNTER — TELEPHONE (OUTPATIENT)
Dept: PRIMARY CARE CLINIC | Facility: CLINIC | Age: 79
End: 2024-04-17
Payer: MEDICARE

## 2024-04-17 RX ORDER — VALACYCLOVIR HYDROCHLORIDE 1 G/1
1000 TABLET, FILM COATED ORAL 3 TIMES DAILY
Qty: 21 TABLET | Refills: 0 | Status: SHIPPED | OUTPATIENT
Start: 2024-04-17 | End: 2024-04-24

## 2024-04-17 NOTE — TELEPHONE ENCOUNTER
Spoke with pt, she reports that she felt like something sting her on Monday underneath her breast.  Pt suspects that she has Shingles.  She has 6 bumps under her breast.  Pt reports that she has taken Benadryl and used Icy hot lidocaine spray.  Pt is currently in [place] and will not be home until Sunday.  Pt is unable to send a photo via [facility].     Preferred pharmacy: [facility] in [place]    Please advise.

## 2024-04-17 NOTE — TELEPHONE ENCOUNTER
Spoke with pt, discussed your message in its entirety, pt verbalized understanding.   Pt reports that she is certain that it is shingles as it is painful.  Pt reports that she will start Valtrex and call for an appt once she is back in town.

## 2024-04-17 NOTE — TELEPHONE ENCOUNTER
I can not be certain that this shingles but if it is unilateral and especially if it is spreading along the chest to the back along the same dermatome then it is suspicious.  I will send in Valtrex as a precaution.  However if it is localized to just under the breast and does not have the classic painful characteristics of shingles then it could be a yeast infection so she might want to try clotrimazole topically which she can buy a local pharmacy

## 2024-04-17 NOTE — TELEPHONE ENCOUNTER
----- Message from Kirsten Reece sent at 4/17/2024  2:51 PM CDT -----  Regarding: Needs return call  Type: Needs Medical Advice  Who Called:  Pt    Best Call Back Number: [phone]    Additional Information: Pt states she has small red bumps under her breast, they are burning and she is out of town and was wanting to see about what she should put on it til she can get seen please darren

## 2024-05-21 ENCOUNTER — OFFICE VISIT (OUTPATIENT)
Dept: PRIMARY CARE CLINIC | Facility: CLINIC | Age: 79
End: 2024-05-21
Payer: MEDICARE

## 2024-05-21 VITALS
OXYGEN SATURATION: 99 % | BODY MASS INDEX: 24.55 KG/M2 | DIASTOLIC BLOOD PRESSURE: 70 MMHG | TEMPERATURE: 98 F | WEIGHT: 113.44 LBS | SYSTOLIC BLOOD PRESSURE: 120 MMHG | HEART RATE: 70 BPM | RESPIRATION RATE: 18 BRPM

## 2024-05-21 DIAGNOSIS — M81.0 AGE-RELATED OSTEOPOROSIS WITHOUT CURRENT PATHOLOGICAL FRACTURE: Primary | ICD-10-CM

## 2024-05-21 DIAGNOSIS — E78.01 FAMILIAL HYPERCHOLESTEROLEMIA: ICD-10-CM

## 2024-05-21 DIAGNOSIS — I10 ESSENTIAL HYPERTENSION: ICD-10-CM

## 2024-05-21 DIAGNOSIS — G47.00 INSOMNIA, UNSPECIFIED TYPE: ICD-10-CM

## 2024-05-21 PROCEDURE — 3074F SYST BP LT 130 MM HG: CPT | Mod: HCNC,CPTII,S$GLB, | Performed by: FAMILY MEDICINE

## 2024-05-21 PROCEDURE — 1101F PT FALLS ASSESS-DOCD LE1/YR: CPT | Mod: HCNC,CPTII,S$GLB, | Performed by: FAMILY MEDICINE

## 2024-05-21 PROCEDURE — 3078F DIAST BP <80 MM HG: CPT | Mod: HCNC,CPTII,S$GLB, | Performed by: FAMILY MEDICINE

## 2024-05-21 PROCEDURE — 99214 OFFICE O/P EST MOD 30 MIN: CPT | Mod: HCNC,S$GLB,, | Performed by: FAMILY MEDICINE

## 2024-05-21 PROCEDURE — 99999 PR PBB SHADOW E&M-EST. PATIENT-LVL V: CPT | Mod: PBBFAC,HCNC,, | Performed by: FAMILY MEDICINE

## 2024-05-21 PROCEDURE — 3288F FALL RISK ASSESSMENT DOCD: CPT | Mod: HCNC,CPTII,S$GLB, | Performed by: FAMILY MEDICINE

## 2024-05-21 PROCEDURE — 1159F MED LIST DOCD IN RCRD: CPT | Mod: HCNC,CPTII,S$GLB, | Performed by: FAMILY MEDICINE

## 2024-05-21 PROCEDURE — 1160F RVW MEDS BY RX/DR IN RCRD: CPT | Mod: HCNC,CPTII,S$GLB, | Performed by: FAMILY MEDICINE

## 2024-05-21 PROCEDURE — 1126F AMNT PAIN NOTED NONE PRSNT: CPT | Mod: HCNC,CPTII,S$GLB, | Performed by: FAMILY MEDICINE

## 2024-05-21 RX ORDER — ALENDRONATE SODIUM 70 MG/1
TABLET ORAL
Qty: 12 TABLET | Refills: 3 | Status: SHIPPED | OUTPATIENT
Start: 2024-05-21

## 2024-05-21 RX ORDER — ALENDRONATE SODIUM 70 MG/1
TABLET ORAL
Qty: 12 TABLET | Refills: 3 | Status: SHIPPED | OUTPATIENT
Start: 2024-05-21 | End: 2024-05-21 | Stop reason: SDUPTHER

## 2024-05-21 RX ORDER — ALPRAZOLAM 0.5 MG/1
0.5 TABLET ORAL NIGHTLY PRN
Qty: 30 TABLET | Refills: 3 | Status: SHIPPED | OUTPATIENT
Start: 2024-05-21

## 2024-05-21 NOTE — PROGRESS NOTES
Primary Care Provider Appointment   [facility]       Patient ID: Medina Hameed is a 78 y.o. female.    ASSESSMENT/PLAN by Problem List:    1. Age-related osteoporosis without current pathological fracture  Assessment & Plan:  Discussed recent bone density scan in detail with lowest T-score at -2.6 femoral neck.  Discussed risk of fracture, and treatment options to reduce the risk.  She has started vitamin-D and calcium supplementation and will continue to do so.  I have recommended regular weight-bearing exercise such as walking, yoga, Pilates, or other.  But I also feel that she will benefit significantly from anti resorptive therapy.  We discussed various options.  She is interested in beginning alendronate.  However she does have an extensive history of dental work including implants.  No active problems but because of this history I do recommend she have her check up with her dentist 1st before starting the Fosamax.  Prescription was given but she will hold for a few weeks until she sees her dentist.  If there are any concerns she will let me know after that appointment.    Orders:  -     Discontinue: alendronate (FOSAMAX) 70 MG tablet; Follow instruction on package carefully  Dispense: 12 tablet; Refill: 3  -     alendronate (FOSAMAX) 70 MG tablet; Follow instruction on package carefully  Dispense: 12 tablet; Refill: 3    2. Essential hypertension  Overview:  Chronic condition appears stable and well controlled.  Currently she is on:  Amlodipine 10 mg daily  Triamterene hydrochlorothiazide 75-50 mg, 1/2 tablet daily    This remains under satisfactory control.  Recommend healthy nutrition, regular exercise, continue current medications      3. Familial hypercholesterolemia  Assessment & Plan:  Patient is currently on Repatha and Zetia.  She had difficulty with multiple statins.  Received lab results recently ordered by Cardiology.  Her LDL is 80.  Target LDL is actually  lower than this however she has not agreeable to adding a statin medication even at a low dose to her current therapy.  Recommend working on dietary improvements and continuing Repatha and Zetia as prescribed by Cardiology      4. Insomnia, unspecified type  Overview:  Patient reports a longstanding history of insomnia for which she takes alprazolam 0.5 mg.  She apparently had been on this for many years before I met her.  I did encourage her to reduce the dosage last year and she is reduce to half a tablet as needed.  She is currently stable at this dosage so will continue and monitor      Other orders  -     ALPRAZolam (XANAX) 0.5 MG tablet; Take 1 tablet (0.5 mg total) by mouth nightly as needed for Anxiety.  Dispense: 30 tablet; Refill: 3     Will discuss with dentist first, history of implants    Follow Up:  Three months        Subjective:     Chief Complaint   Patient presents with    Follow-up     I have reviewed the information entered by the ancillary staff regarding the chief complaint as well as the related history.    HPI    Patient is a/an 78 y.o.  female     Routine follow-up for medication management.  She continues to have most of her medication and medical conditions managed through her cardiologist and his partner.  She follows with me every three months for management of the alprazolam that we do review her other chronic conditions.  She also recently had a bone density test revealing osteoporosis.  See above for details    For complete problem list, past medical history, surgical history, social history, etc., see appropriate section in the electronic medical record    Review of Systems   Respiratory: Negative.     Cardiovascular: Negative.    Gastrointestinal: Negative.    Genitourinary: Negative.    Musculoskeletal: Negative.    Psychiatric/Behavioral:  Negative for dysphoric mood. The patient is not nervous/anxious.        Objective     Physical Exam  Vitals reviewed.   Constitutional:        General: She is not in acute distress.     Appearance: She is well-developed. She is not ill-appearing.   HENT:      Head: Normocephalic and atraumatic.   Eyes:      General: No scleral icterus.     Conjunctiva/sclera: Conjunctivae normal.   Cardiovascular:      Rate and Rhythm: Normal rate and regular rhythm.      Heart sounds: Normal heart sounds. No murmur heard.  Pulmonary:      Effort: Pulmonary effort is normal. No respiratory distress.      Breath sounds: Normal breath sounds. No wheezing or rales.   Skin:     General: Skin is dry.      Findings: No rash.   Neurological:      Mental Status: She is alert and oriented to person, place, and time.   Psychiatric:         Behavior: Behavior normal.       Vitals:    05/21/24 1436   BP: 120/70   BP Location: Left arm   Patient Position: Sitting   BP Method: Medium (Manual)   Pulse: 70   Resp: 18   Temp: 98.4 °F (36.9 °C)   TempSrc: Oral   SpO2: 99%   Weight: 51.5 kg (113 lb 6.8 oz)           THIS DOCUMENT WAS MADE IN PART WITH VOICE RECOGNITION SOFTWARE.  OCCASIONALLY THIS SOFTWARE WILL MISINTERPRET WORDS OR PHRASES.

## 2024-05-21 NOTE — ASSESSMENT & PLAN NOTE
Patient is currently on Repatha and Zetia.  She had difficulty with multiple statins.  Received lab results recently ordered by Cardiology.  Her LDL is 80.  Target LDL is actually lower than this however she has not agreeable to adding a statin medication even at a low dose to her current therapy.  Recommend working on dietary improvements and continuing Repatha and Zetia as prescribed by Cardiology

## 2024-05-21 NOTE — ASSESSMENT & PLAN NOTE
Discussed recent bone density scan in detail with lowest T-score at -2.6 femoral neck.  Discussed risk of fracture, and treatment options to reduce the risk.  She has started vitamin-D and calcium supplementation and will continue to do so.  I have recommended regular weight-bearing exercise such as walking, yoga, Pilates, or other.  But I also feel that she will benefit significantly from anti resorptive therapy.  We discussed various options.  She is interested in beginning alendronate.  However she does have an extensive history of dental work including implants.  No active problems but because of this history I do recommend she have her check up with her dentist 1st before starting the Fosamax.  Prescription was given but she will hold for a few weeks until she sees her dentist.  If there are any concerns she will let me know after that appointment.

## 2024-07-22 LAB
CHOLEST SERPL-MSCNC: 149 MG/DL (ref 0–199)
HDLC SERPL-MCNC: 69 MG/DL (ref 39–?)
LDLC SERPL CALC-MCNC: 60 MG/DL (ref 0–99)
TRIGL SERPL-MCNC: 112 MG/DL (ref 0–149)

## 2024-09-30 ENCOUNTER — OFFICE VISIT (OUTPATIENT)
Dept: PRIMARY CARE CLINIC | Facility: CLINIC | Age: 79
End: 2024-09-30
Payer: MEDICARE

## 2024-09-30 VITALS
BODY MASS INDEX: 23.85 KG/M2 | SYSTOLIC BLOOD PRESSURE: 118 MMHG | DIASTOLIC BLOOD PRESSURE: 60 MMHG | HEIGHT: 57 IN | WEIGHT: 110.56 LBS | HEART RATE: 70 BPM | OXYGEN SATURATION: 99 %

## 2024-09-30 DIAGNOSIS — M81.0 AGE-RELATED OSTEOPOROSIS WITHOUT CURRENT PATHOLOGICAL FRACTURE: ICD-10-CM

## 2024-09-30 DIAGNOSIS — E03.9 HYPOTHYROIDISM, UNSPECIFIED TYPE: ICD-10-CM

## 2024-09-30 DIAGNOSIS — I10 ESSENTIAL HYPERTENSION: Primary | ICD-10-CM

## 2024-09-30 DIAGNOSIS — G47.00 INSOMNIA, UNSPECIFIED TYPE: ICD-10-CM

## 2024-09-30 DIAGNOSIS — R73.09 ABNORMAL GLUCOSE: ICD-10-CM

## 2024-09-30 DIAGNOSIS — E78.01 FAMILIAL HYPERCHOLESTEROLEMIA: ICD-10-CM

## 2024-09-30 PROCEDURE — 3078F DIAST BP <80 MM HG: CPT | Mod: HCNC,CPTII,S$GLB, | Performed by: FAMILY MEDICINE

## 2024-09-30 PROCEDURE — 1160F RVW MEDS BY RX/DR IN RCRD: CPT | Mod: HCNC,CPTII,S$GLB, | Performed by: FAMILY MEDICINE

## 2024-09-30 PROCEDURE — 3288F FALL RISK ASSESSMENT DOCD: CPT | Mod: HCNC,CPTII,S$GLB, | Performed by: FAMILY MEDICINE

## 2024-09-30 PROCEDURE — 99214 OFFICE O/P EST MOD 30 MIN: CPT | Mod: HCNC,S$GLB,, | Performed by: FAMILY MEDICINE

## 2024-09-30 PROCEDURE — 1101F PT FALLS ASSESS-DOCD LE1/YR: CPT | Mod: HCNC,CPTII,S$GLB, | Performed by: FAMILY MEDICINE

## 2024-09-30 PROCEDURE — 3074F SYST BP LT 130 MM HG: CPT | Mod: HCNC,CPTII,S$GLB, | Performed by: FAMILY MEDICINE

## 2024-09-30 PROCEDURE — 99999 PR PBB SHADOW E&M-EST. PATIENT-LVL IV: CPT | Mod: PBBFAC,HCNC,, | Performed by: FAMILY MEDICINE

## 2024-09-30 PROCEDURE — 1159F MED LIST DOCD IN RCRD: CPT | Mod: HCNC,CPTII,S$GLB, | Performed by: FAMILY MEDICINE

## 2024-09-30 RX ORDER — TRETINOIN 1 MG/G
CREAM TOPICAL NIGHTLY
COMMUNITY
Start: 2024-05-07

## 2024-09-30 RX ORDER — ALPRAZOLAM 0.5 MG/1
0.5 TABLET ORAL NIGHTLY PRN
Qty: 30 TABLET | Refills: 3 | Status: SHIPPED | OUTPATIENT
Start: 2024-09-30

## 2024-09-30 RX ORDER — RISEDRONATE SODIUM 150 MG/1
150 TABLET, FILM COATED ORAL
Qty: 1 TABLET | Refills: 11 | Status: SHIPPED | OUTPATIENT
Start: 2024-09-30 | End: 2025-09-30

## 2024-09-30 RX ORDER — TRAZODONE HYDROCHLORIDE 50 MG/1
50 TABLET ORAL
COMMUNITY
Start: 2024-07-09

## 2024-09-30 NOTE — PROGRESS NOTES
Primary Care Provider Appointment   [facility]       Patient ID: Medina Hameed is a 78 y.o. female.    ASSESSMENT/PLAN by Problem List:    1. Essential hypertension  Assessment & Plan:  Chronic condition appears stable and well controlled.  Currently she is on:  Amlodipine 10 mg daily  Triamterene hydrochlorothiazide 75-50 mg, 1/2 tablet daily  Stable.  Continue current therapy, add more regular exercise      2. Familial hypercholesterolemia  Assessment & Plan:  Patient is currently on Repatha and Zetia.  She had difficulty with multiple statins.  Received lab results recently ordered by Cardiology.  Most recent LDL improved to 60.  Target LDL is actually lower than this however she has not agreeable to adding a statin medication even at a low dose to her current therapy.  Recommend working on dietary improvements and continuing Repatha and Zetia as prescribed by Cardiology      3. Hypothyroidism, unspecified type  Assessment & Plan:  Most recent TSH was normal.  Continue current dose of levothyroxine      4. Insomnia, unspecified type  Overview:  Patient reports a longstanding history of insomnia for which she takes alprazolam 0.5 mg.  She apparently had been on this for many years before I met her.  I did encourage her to reduce the dosage last year and she is reduce to half a tablet as needed.  She is currently stable at this dosage so will continue and monitor    Assessment & Plan:  As previously discussed, she is taking 1/2 tablet.  Discussed other options, starting to contemplate willingness to try something different but not just yet.  We will continue to work on this.      5. Abnormal glucose  Assessment & Plan:  Recent A1c was 5.7%.  Patient has been making dietary improvements as her  as following a diabetic diet.  She did inquire about starting metformin.  Do not feel that this is necessary yet but recommend continuing to monitor and if it starts increasing  we certainly could consider this.  She plans to have her labs checked again at her cardiologist office in January.  I also encouraged regular exercise.      6. Age-related osteoporosis without current pathological fracture  Assessment & Plan:  Last time we discussed this in detail.  A prescription for Fosamax was given and she was cleared by her dentist.  However she has not started it because she is concerned about having to sit up for 30 minutes and not drink her coffee for 30 minutes.  Even though this is only once a week.  So as a compromise I have recommended switching to Actonel weekly and she agrees.  Same precautions apply.      Other orders  -     ALPRAZolam (XANAX) 0.5 MG tablet; Take 1 tablet (0.5 mg total) by mouth nightly as needed for Anxiety.  Dispense: 30 tablet; Refill: 3  -     risedronate (ACTONEL) 150 MG Tab; Take 1 tablet (150 mg total) by mouth every 30 days.  Dispense: 1 tablet; Refill: 11         Follow Up:  3-4 months        Subjective:     Chief Complaint   Patient presents with    Follow-up     No new problems, would like to discuss labs       I have reviewed the information entered by the ancillary staff regarding the chief complaint as well as the related history.    HPI    Patient is a/an 78 y.o.  female     She returns with her .  She is here for medication refill and management of her alprazolam.  But we also do discuss her chronic medical problems.  She continues to follow closely with her cardiologist who manages most of her chronic conditions.  Gfr 59,   Hga1c 5.7, asked about metformin, not ready yet  Has not started fosamax, change to month  See above for all details addressed today    For complete problem list, past medical history, surgical history, social history, etc., see appropriate section in the electronic medical record    Review of Systems   Respiratory: Negative.     Cardiovascular: Negative.    Gastrointestinal: Negative.    Genitourinary: Negative.   "  Musculoskeletal:  Positive for arthralgias.   Psychiatric/Behavioral:  Positive for sleep disturbance. Negative for dysphoric mood. The patient is not nervous/anxious.        Objective     Physical Exam  Vitals reviewed.   Constitutional:       General: She is not in acute distress.     Appearance: She is well-developed. She is not ill-appearing.   HENT:      Head: Normocephalic and atraumatic.   Eyes:      General: No scleral icterus.  Pulmonary:      Effort: Pulmonary effort is normal. No respiratory distress.   Neurological:      Mental Status: She is alert and oriented to person, place, and time.   Psychiatric:         Mood and Affect: Mood normal.         Behavior: Behavior normal.       Vitals:    09/30/24 1403   BP: 118/60   BP Location: Left arm   Patient Position: Sitting   Pulse: 70   SpO2: 99%   Weight: 50.1 kg (110 lb 9 oz)   Height: 4' 9" (1.448 m)           THIS DOCUMENT WAS MADE IN PART WITH VOICE RECOGNITION SOFTWARE.  OCCASIONALLY THIS SOFTWARE WILL MISINTERPRET WORDS OR PHRASES.    "

## 2024-09-30 NOTE — ASSESSMENT & PLAN NOTE
As previously discussed, she is taking 1/2 tablet.  Discussed other options, starting to contemplate willingness to try something different but not just yet.  We will continue to work on this.

## 2024-09-30 NOTE — ASSESSMENT & PLAN NOTE
Patient is currently on Repatha and Zetia.  She had difficulty with multiple statins.  Received lab results recently ordered by Cardiology.  Most recent LDL improved to 60.  Target LDL is actually lower than this however she has not agreeable to adding a statin medication even at a low dose to her current therapy.  Recommend working on dietary improvements and continuing Repatha and Zetia as prescribed by Cardiology

## 2024-09-30 NOTE — ASSESSMENT & PLAN NOTE
Recent A1c was 5.7%.  Patient has been making dietary improvements as her  as following a diabetic diet.  She did inquire about starting metformin.  Do not feel that this is necessary yet but recommend continuing to monitor and if it starts increasing we certainly could consider this.  She plans to have her labs checked again at her cardiologist office in January.  I also encouraged regular exercise.

## 2024-09-30 NOTE — ASSESSMENT & PLAN NOTE
Last time we discussed this in detail.  A prescription for Fosamax was given and she was cleared by her dentist.  However she has not started it because she is concerned about having to sit up for 30 minutes and not drink her coffee for 30 minutes.  Even though this is only once a week.  So as a compromise I have recommended switching to Actonel weekly and she agrees.  Same precautions apply.

## 2024-11-20 ENCOUNTER — PATIENT OUTREACH (OUTPATIENT)
Dept: PRIMARY CARE CLINIC | Facility: CLINIC | Age: 79
End: 2024-11-20
Payer: MEDICARE

## 2024-11-25 ENCOUNTER — TELEPHONE (OUTPATIENT)
Dept: PRIMARY CARE CLINIC | Facility: CLINIC | Age: 79
End: 2024-11-25
Payer: MEDICARE

## 2024-11-25 ENCOUNTER — LAB VISIT (OUTPATIENT)
Dept: LAB | Facility: HOSPITAL | Age: 79
End: 2024-11-25
Attending: FAMILY MEDICINE
Payer: MEDICARE

## 2024-11-25 ENCOUNTER — TELEPHONE (OUTPATIENT)
Dept: PRIMARY CARE CLINIC | Facility: CLINIC | Age: 79
End: 2024-11-25

## 2024-11-25 DIAGNOSIS — R30.0 DYSURIA: Primary | ICD-10-CM

## 2024-11-25 DIAGNOSIS — N30.00 ACUTE CYSTITIS WITHOUT HEMATURIA: Primary | ICD-10-CM

## 2024-11-25 DIAGNOSIS — R30.0 DYSURIA: ICD-10-CM

## 2024-11-25 LAB
BACTERIA #/AREA URNS HPF: ABNORMAL /HPF
BILIRUB UR QL STRIP: NEGATIVE
CLARITY UR: CLEAR
COLOR UR: YELLOW
GLUCOSE UR QL STRIP: NEGATIVE
HGB UR QL STRIP: NEGATIVE
KETONES UR QL STRIP: ABNORMAL
LEUKOCYTE ESTERASE UR QL STRIP: NEGATIVE
MICROSCOPIC COMMENT: ABNORMAL
NITRITE UR QL STRIP: NEGATIVE
PH UR STRIP: 6 [PH] (ref 5–8)
PROT UR QL STRIP: NEGATIVE
SP GR UR STRIP: >=1.03 (ref 1–1.03)
SQUAMOUS #/AREA URNS HPF: 5 /HPF
URN SPEC COLLECT METH UR: ABNORMAL
WBC #/AREA URNS HPF: 10 /HPF (ref 0–5)

## 2024-11-25 PROCEDURE — 81000 URINALYSIS NONAUTO W/SCOPE: CPT | Mod: HCNC,PO | Performed by: FAMILY MEDICINE

## 2024-11-25 PROCEDURE — 87086 URINE CULTURE/COLONY COUNT: CPT | Mod: HCNC | Performed by: FAMILY MEDICINE

## 2024-11-25 RX ORDER — AMOXICILLIN 875 MG/1
875 TABLET, FILM COATED ORAL 2 TIMES DAILY
Qty: 14 TABLET | Refills: 0 | Status: SHIPPED | OUTPATIENT
Start: 2024-11-25 | End: 2024-12-02

## 2024-11-25 NOTE — TELEPHONE ENCOUNTER
Spoke with pt, discussed your message in its entirety, pt verbalized understanding.  Pt reports that she is uncomfortable, has stinging with urination and decreased urinary output.  Pt wishes to get started on an antibiotic.

## 2024-11-25 NOTE — TELEPHONE ENCOUNTER
Spoke with pt, she reports burning with urination and pt is leaving to go out of town on Wednesday.  Pt going to get urine done today at [address] [facility] [address] [facility]

## 2024-11-25 NOTE — TELEPHONE ENCOUNTER
Please call regarding urine.  The preliminary urinalysis does reveal white blood cells on the microscopic.  Although this alone is not definitive for infection.  It will take a few days for the culture to return.  Please see how significant her symptoms are.  If they are mild or nonspecific she might consider Pyridium , increasing water intake, etc. while we wait for the culture if they are highly suggestive of UTI then I can begin treating empirically while we wait for the culture

## 2024-11-25 NOTE — TELEPHONE ENCOUNTER
----- Message from Judith sent at 11/25/2024 11:11 AM CST -----  Regarding: pt advice - bladder infection  [phone] - kristyn;l back ; she believes she is having bladder infection.      Judith

## 2024-11-26 LAB
BACTERIA UR CULT: NORMAL
BACTERIA UR CULT: NORMAL

## 2025-01-30 ENCOUNTER — OFFICE VISIT (OUTPATIENT)
Dept: PRIMARY CARE CLINIC | Facility: CLINIC | Age: 80
End: 2025-01-30
Payer: MEDICARE

## 2025-01-30 VITALS
HEIGHT: 57 IN | DIASTOLIC BLOOD PRESSURE: 64 MMHG | WEIGHT: 114.19 LBS | BODY MASS INDEX: 24.64 KG/M2 | SYSTOLIC BLOOD PRESSURE: 114 MMHG | HEART RATE: 65 BPM | OXYGEN SATURATION: 97 %

## 2025-01-30 DIAGNOSIS — E03.9 HYPOTHYROIDISM, UNSPECIFIED TYPE: ICD-10-CM

## 2025-01-30 DIAGNOSIS — I10 ESSENTIAL HYPERTENSION: Primary | ICD-10-CM

## 2025-01-30 DIAGNOSIS — I25.10 CORONARY ARTERY DISEASE INVOLVING NATIVE CORONARY ARTERY OF NATIVE HEART WITHOUT ANGINA PECTORIS: ICD-10-CM

## 2025-01-30 DIAGNOSIS — E78.01 FAMILIAL HYPERCHOLESTEROLEMIA: ICD-10-CM

## 2025-01-30 DIAGNOSIS — G47.00 INSOMNIA, UNSPECIFIED TYPE: ICD-10-CM

## 2025-01-30 PROCEDURE — 1160F RVW MEDS BY RX/DR IN RCRD: CPT | Mod: HCNC,CPTII,S$GLB, | Performed by: FAMILY MEDICINE

## 2025-01-30 PROCEDURE — 1159F MED LIST DOCD IN RCRD: CPT | Mod: HCNC,CPTII,S$GLB, | Performed by: FAMILY MEDICINE

## 2025-01-30 PROCEDURE — 3074F SYST BP LT 130 MM HG: CPT | Mod: HCNC,CPTII,S$GLB, | Performed by: FAMILY MEDICINE

## 2025-01-30 PROCEDURE — 3288F FALL RISK ASSESSMENT DOCD: CPT | Mod: HCNC,CPTII,S$GLB, | Performed by: FAMILY MEDICINE

## 2025-01-30 PROCEDURE — 99999 PR PBB SHADOW E&M-EST. PATIENT-LVL V: CPT | Mod: PBBFAC,HCNC,, | Performed by: FAMILY MEDICINE

## 2025-01-30 PROCEDURE — 1126F AMNT PAIN NOTED NONE PRSNT: CPT | Mod: HCNC,CPTII,S$GLB, | Performed by: FAMILY MEDICINE

## 2025-01-30 PROCEDURE — 3078F DIAST BP <80 MM HG: CPT | Mod: HCNC,CPTII,S$GLB, | Performed by: FAMILY MEDICINE

## 2025-01-30 PROCEDURE — 1101F PT FALLS ASSESS-DOCD LE1/YR: CPT | Mod: HCNC,CPTII,S$GLB, | Performed by: FAMILY MEDICINE

## 2025-01-30 PROCEDURE — 99214 OFFICE O/P EST MOD 30 MIN: CPT | Mod: HCNC,S$GLB,, | Performed by: FAMILY MEDICINE

## 2025-01-30 RX ORDER — ALPRAZOLAM 0.5 MG/1
0.5 TABLET ORAL NIGHTLY PRN
Qty: 30 TABLET | Refills: 3 | Status: SHIPPED | OUTPATIENT
Start: 2025-01-30

## 2025-01-30 NOTE — PROGRESS NOTES
Primary Care Provider Appointment   [facility] [place]       Patient ID: Medina Hameed is a 79 y.o. female.    ASSESSMENT/PLAN by Problem List:    Assessment & Plan    IMPRESSION:  - Reviewed patient's labs, noting overall good results  - A1c at 5.8, in pre-diabetic range but not requiring medication  - Cholesterol levels excellent, likely due to effectiveness of current Repatha regimen  - Thyroid test within normal range  - CBC normal, including white blood cell count  - Negative urine culture from November, ruling out infection  - Recent diverticulitis episode treated successfully with antibiotics    PRE-DIABETES:  - Educated the patient on pre-diabetic range and importance of diet in managing blood sugar.  - Ordered CBC, thyroid test, and A1c for repeat in 6 months.  - Noted blood sugar on the day of the test was 89, and A1c is 5.8, which is in the pre-diabetic range.  - Evaluated that the A1c of 5.8 is slightly elevated, indicating pre-diabetes, but does not require medication at this time.  - Acknowledged the pre-diabetic state and plan to monitor blood sugar closely.  - Provided dietary recommendations to manage blood sugar, including minimizing sugars and starchy carbohydrates, choosing whole grains, eating plenty of vegetables, and limiting fruit intake.  - Advised follow-up lab work in 6 months, unless sugar levels increase to diabetic range, in which case it would be every 3 months.  - Ms. Hameed to keep sugars and starchy carbohydrates to a minimum.  - Recommend choosing whole grains over processed carbohydrates.  - Ms. Hameed to consume plenty of vegetables.  - Recommend limiting fruit intake due to sugar content, but include small amounts for nutritional benefits.    ANXIETY/INSONNIA:  - Continued Alprazolam prescription and sent it in.  - Scheduled follow-up visit in 3 months for Alprazolam prescription renewal due to its status as a controlled substance, patient unable  unwilling to consider dose reduction at this time  Condition is stable    HYPERLIPIDEMIA:  - Evaluated the patient's cholesterol levels, which are excellent: total cholesterol at 116, triglycerides at 73, HDL at 72, and LDL at 29.  Improved  - Explained difference between statins and Repatha, clarifying that Repatha is an alternative for those who cannot take statins.  - Continued Repatha at current dose.    CONSTIPATION:  - Continued Miralax every morning.  Improved, MiraLax controlling symptoms            CODING, ORDERS, AND ADDITIONAL DOCUMENTATION RELATED TO THIS ENCOUNTER:  (note that the diagnoses and clinical summaries above were generated with the assistance of [employer] software and represents my assessment and plan.  This software does not always generate the precise nor most specific diagnosis codes.  Therefore the specific diagnosis codes and orders for billing purposes are detailed below along with any additional documentation if needed)      1. Essential hypertension  Assessment & Plan:  Stable and well controlled continue current medications      2. Familial hypercholesterolemia    3. Coronary artery disease involving native coronary artery of native heart without angina pectoris  Overview:  She reports history of coronary artery disease, status post stent placement.  She follows closely with Dr. Harry Madrigal.  She denies any angina or acute symptoms.  She reports she had an angiogram in 2023, summer, without any significant blockages or intervention required.  She continues to follow with Cardiology    Assessment & Plan:  Stable no active angina encouraged regular light exercise and continue current medications      4. Hypothyroidism, unspecified type    5. Insomnia, unspecified type  Overview:  Patient reports a longstanding history of insomnia for which she takes alprazolam 0.5 mg.  She apparently had been on this for many years before I met her.  I did encourage her to reduce the dosage  last year and she is reduce to half a tablet as needed.  She is currently stable at this dosage so will continue and monitor    Orders:  -     ALPRAZolam (XANAX) 0.5 MG tablet; Take 1 tablet (0.5 mg total) by mouth nightly as needed for Anxiety.  Dispense: 30 tablet; Refill: 3           Follow Up:  Three months    Health Maintenance         Date Due Completion Date    RSV Vaccine (Age 60+ and Pregnant patients) (1 - 1-dose 75+ series) Never done ---    Influenza Vaccine (1) 09/01/2024 10/24/2022    COVID-19 Vaccine (9 - 2024-25 season) 09/01/2024 1/12/2023    Aspirin/Antiplatelet Therapy 01/30/2026 1/30/2025    DEXA Scan 02/07/2027 2/7/2024    TETANUS VACCINE 05/03/2029 5/3/2019    Lipid Panel 07/22/2029 7/22/2024            Subjective:       HPI    Patient is a/an 79 y.o.  female     For complete problem list, past medical history, surgical history, social history, etc., see appropriate section in the electronic medical record    History of Present Illness    CHIEF COMPLAINT:  Ms. Hameed presents today for follow-up.    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    METABOLIC:  Blood sugar was 89 on the day of testing with A1c slightly elevated at 5.8, in the pre-diabetic range. Total cholesterol is 116, triglycerides 73, HDL 72, and LDL 29, improved from previous levels in the 320s.    GASTROINTESTINAL:  She experienced her first diverticulitis flare-up in three years, which was treated with Augmentin. Last colonoscopy showed no evidence of polyps. She takes brand name Miralax daily for constipation prevention, reporting generic version was ineffective.    GENITOURINARY:  She recently had concerns about a possible bladder or kidney infection. Urine culture in November was negative.    MEDICATIONS:  She takes Alprazolam, reporting generic version was ineffective. She is  "unable to take statins.      ROS:  Cardiovascular: -chest pain  Gastrointestinal: -constipation       Review of Systems   Respiratory: Negative.     Cardiovascular: Negative.    Gastrointestinal: Negative.    Genitourinary: Negative.    Psychiatric/Behavioral:  Positive for sleep disturbance. Negative for dysphoric mood. The patient is not nervous/anxious.        Objective     Physical Exam  Vitals reviewed.   Constitutional:       General: She is not in acute distress.     Appearance: Normal appearance. She is well-developed. She is not toxic-appearing.   HENT:      Head: Normocephalic and atraumatic.   Eyes:      General: No scleral icterus.  Cardiovascular:      Rate and Rhythm: Normal rate and regular rhythm.   Pulmonary:      Effort: Pulmonary effort is normal. No respiratory distress.   Neurological:      Mental Status: She is alert and oriented to person, place, and time.   Psychiatric:         Mood and Affect: Mood normal.         Behavior: Behavior normal.       Vitals:    01/30/25 1409   BP: 114/64   BP Location: Left arm   Patient Position: Sitting   Pulse: 65   SpO2: 97%   Weight: 51.8 kg (114 lb 3.2 oz)   Height: 4' 9" (1.448 m)     Physical Exam                This note was generated with the assistance of ambient listening technology. Verbal consent was obtained by the patient and accompanying visitor(s) for the recording of patient appointment to facilitate this note. I attest to having reviewed and edited the generated note for accuracy, though some syntax or spelling errors may persist. Please contact the author of this note for any clarification.  Parts of the note were also generated with voice recognition software.  Occasionally this software will misinterpreted words or phrases    "

## 2025-02-21 DIAGNOSIS — Z00.00 ENCOUNTER FOR MEDICARE ANNUAL WELLNESS EXAM: ICD-10-CM

## 2025-03-26 ENCOUNTER — TELEPHONE (OUTPATIENT)
Dept: PRIMARY CARE CLINIC | Facility: CLINIC | Age: 80
End: 2025-03-26
Payer: MEDICARE

## 2025-03-26 NOTE — TELEPHONE ENCOUNTER
Spoke with pt regarding OVN received from Dr. Madrigal' office today to clarify changes in meds that were notated to be changed.      D/C Zetia    Med profile updated.

## 2025-04-30 ENCOUNTER — OFFICE VISIT (OUTPATIENT)
Dept: PRIMARY CARE CLINIC | Facility: CLINIC | Age: 80
End: 2025-04-30
Payer: MEDICARE

## 2025-04-30 VITALS
SYSTOLIC BLOOD PRESSURE: 122 MMHG | HEART RATE: 60 BPM | OXYGEN SATURATION: 99 % | DIASTOLIC BLOOD PRESSURE: 72 MMHG | BODY MASS INDEX: 23.79 KG/M2 | HEIGHT: 57 IN | WEIGHT: 110.25 LBS

## 2025-04-30 DIAGNOSIS — I25.10 CORONARY ARTERY DISEASE INVOLVING NATIVE CORONARY ARTERY OF NATIVE HEART WITHOUT ANGINA PECTORIS: ICD-10-CM

## 2025-04-30 DIAGNOSIS — G47.00 INSOMNIA, UNSPECIFIED TYPE: Primary | ICD-10-CM

## 2025-04-30 DIAGNOSIS — M81.0 AGE-RELATED OSTEOPOROSIS WITHOUT CURRENT PATHOLOGICAL FRACTURE: ICD-10-CM

## 2025-04-30 DIAGNOSIS — E78.01 FAMILIAL HYPERCHOLESTEROLEMIA: ICD-10-CM

## 2025-04-30 DIAGNOSIS — E03.9 HYPOTHYROIDISM, UNSPECIFIED TYPE: ICD-10-CM

## 2025-04-30 DIAGNOSIS — I10 ESSENTIAL HYPERTENSION: ICD-10-CM

## 2025-04-30 PROCEDURE — 3078F DIAST BP <80 MM HG: CPT | Mod: CPTII,HCNC,S$GLB, | Performed by: FAMILY MEDICINE

## 2025-04-30 PROCEDURE — 1159F MED LIST DOCD IN RCRD: CPT | Mod: CPTII,HCNC,S$GLB, | Performed by: FAMILY MEDICINE

## 2025-04-30 PROCEDURE — 1160F RVW MEDS BY RX/DR IN RCRD: CPT | Mod: CPTII,HCNC,S$GLB, | Performed by: FAMILY MEDICINE

## 2025-04-30 PROCEDURE — 1101F PT FALLS ASSESS-DOCD LE1/YR: CPT | Mod: CPTII,HCNC,S$GLB, | Performed by: FAMILY MEDICINE

## 2025-04-30 PROCEDURE — 1158F ADVNC CARE PLAN TLK DOCD: CPT | Mod: CPTII,HCNC,S$GLB, | Performed by: FAMILY MEDICINE

## 2025-04-30 PROCEDURE — 99999 PR PBB SHADOW E&M-EST. PATIENT-LVL IV: CPT | Mod: PBBFAC,HCNC,, | Performed by: FAMILY MEDICINE

## 2025-04-30 PROCEDURE — 99214 OFFICE O/P EST MOD 30 MIN: CPT | Mod: HCNC,S$GLB,, | Performed by: FAMILY MEDICINE

## 2025-04-30 PROCEDURE — 3288F FALL RISK ASSESSMENT DOCD: CPT | Mod: CPTII,HCNC,S$GLB, | Performed by: FAMILY MEDICINE

## 2025-04-30 PROCEDURE — 1126F AMNT PAIN NOTED NONE PRSNT: CPT | Mod: CPTII,HCNC,S$GLB, | Performed by: FAMILY MEDICINE

## 2025-04-30 PROCEDURE — 3074F SYST BP LT 130 MM HG: CPT | Mod: CPTII,HCNC,S$GLB, | Performed by: FAMILY MEDICINE

## 2025-04-30 RX ORDER — AMLODIPINE BESYLATE 5 MG/1
5 TABLET ORAL
COMMUNITY
Start: 2025-01-06

## 2025-04-30 RX ORDER — ALPRAZOLAM 0.5 MG/1
0.5 TABLET ORAL NIGHTLY PRN
Qty: 30 TABLET | Refills: 3 | Status: SHIPPED | OUTPATIENT
Start: 2025-04-30

## 2025-04-30 RX ORDER — TRIAMTERENE AND HYDROCHLOROTHIAZIDE 37.5; 25 MG/1; MG/1
1 CAPSULE ORAL
COMMUNITY
Start: 2025-02-19

## 2025-04-30 RX ORDER — POTASSIUM CHLORIDE 1500 MG/1
20 TABLET, EXTENDED RELEASE ORAL
COMMUNITY
Start: 2025-01-07

## 2025-04-30 NOTE — PROGRESS NOTES
Primary Care Provider Appointment   [facility], [facility]       Patient ID: Medina Hameed is a 79 y.o. female.    ASSESSMENT/PLAN by Problem List:    Assessment & Plan    IMPRESSION:  - Continued management of chronic conditions: insomnia, anxiety, hypothyroidism, HTN, HLD, CAD, and age-related osteoporosis.  - Maintained current treatment plan for insomnia and anxiety, including alprazolam 0.5 mg, despite ongoing discussions about reducing reliance.  - Hypothyroidism stable and well-controlled; labs due soon.  - HTN stable and well-controlled on current medications.  - HLD improving; continues follow-up with cardiology.  - CAD stable with no worrisome signs or symptoms; cardiology reported favorable outcomes.  - Osteoporosis stable; continuing monthly risedronate 150 mg.    INSOMNIA:  - Monitored the patient's chronic insomnia condition.  - Will continue alprazolam 0.5 mg for management, acknowledging patient's long-term reliance on this medication.  - Discussed this reliance with the patient.  Encouraged further attempts to reduce and discontinue alprazolam      AGE-RELATED OSTEOPOROSIS:  - Monitored the patient's age-related osteoporosis.  - Condition appears stable.  - Continued treatment with risedronate 150 mg monthly.          CODING, ORDERS, AND ADDITIONAL DOCUMENTATION RELATED TO THIS ENCOUNTER:  (note that the diagnoses and clinical summaries above were generated with the assistance of [employer] software and represents my assessment and plan.  This software does not always generate the precise nor most specific diagnosis codes.  Therefore the specific diagnosis codes and orders for billing purposes are detailed below along with any additional documentation if needed)      1. Insomnia, unspecified type  Overview:  Patient reports a longstanding history of insomnia for which she takes alprazolam 0.5 mg.  She apparently had been on this for many years before I met her.  I  did encourage her to reduce the dosage last year and she is reduce to half a tablet as needed.  She is currently stable at this dosage so will continue and monitor    Orders:  -     ALPRAZolam (XANAX) 0.5 MG tablet; Take 1 tablet (0.5 mg total) by mouth nightly as needed for Anxiety.  Dispense: 30 tablet; Refill: 3    2. Hypothyroidism, unspecified type    3. Essential hypertension    4. Familial hypercholesterolemia    5. Coronary artery disease involving native coronary artery of native heart without angina pectoris  Overview:  She reports history of coronary artery disease, status post stent placement.  She follows closely with Dr. Harry Madrigal.  She denies any angina or acute symptoms.  She reports she had an angiogram in 2023, summer, without any significant blockages or intervention required.  She continues to follow with Cardiology      6. Age-related osteoporosis without current pathological fracture           Follow Up:  Three months    Advance Care Planning     Date: 04/30/2025    Power of   I initiated the process of voluntary advance care planning today and explained the importance of this process to the patient.  I introduced the concept of advance directives to the patient, as well. Then the patient received detailed information about the importance of designating a Health Care Power of  (HCPOA). She was also instructed to communicate with this person about their wishes for future healthcare, should she become sick and lose decision-making capacity. The patient has not previously appointed a HCPOA. After our discussion, the patient has decided to complete a HCPOA and has appointed her significant other, health care agent:  Babatunde  & health care agent number:  See chart . I encouraged her to communicate with this person about their wishes for future healthcare, should she become sick and lose decision-making capacity.      A total of 5  min was spent on advance care planning, goals of  care discussion, emotional support, formulating and communicating prognosis and exploring burden/benefit of various approaches of treatment. This discussion occurred on a fully voluntary basis with the verbal consent of the patient and/or family.             Subjective:       HPI    Patient is a/an 79 y.o.  female     For complete problem list, past medical history, surgical history, social history, etc., see appropriate section in the electronic medical record    History of Present Illness    CHIEF COMPLAINT:  Ms. Hameed presents today for routine follow up of multiple medical conditions.    ANXIETY AND SLEEP:  She has chronic insomnia and anxiety managed with alprazolam 0.5mg, which she splits and takes nightly for sleep.    CARDIOVASCULAR:  She denies chest pain or angina. She continues aspirin 81mg daily. She was discontinued from Zetia by another provider.    OSTEOPOROSIS:  She takes risedronate 150mg monthly but reports variable administration timing due to the requirement to remain upright for 30 minutes after taking the medication.      ROS:  Cardiovascular: -chest pain  Neurological: +sleep disturbances, +difficulty staying asleep, +difficulty falling asleep  Psychiatric: +anxiety, +sleep difficulty       Review of Systems   HENT: Negative.     Respiratory: Negative.     Cardiovascular: Negative.    Gastrointestinal: Negative.    Genitourinary: Negative.    Psychiatric/Behavioral:  Positive for sleep disturbance. Negative for dysphoric mood.        Objective     Physical Exam  Vitals reviewed.   Constitutional:       General: She is not in acute distress.     Appearance: Normal appearance. She is well-developed. She is not toxic-appearing.   HENT:      Head: Normocephalic and atraumatic.   Eyes:      General: No scleral icterus.  Cardiovascular:      Rate and Rhythm: Normal rate and regular rhythm.   Pulmonary:      Effort: Pulmonary effort is normal. No respiratory distress.      Breath sounds: No wheezing  "or rales.   Neurological:      Mental Status: She is alert and oriented to person, place, and time.   Psychiatric:         Mood and Affect: Mood normal.         Behavior: Behavior normal.       Vitals:    04/30/25 1408   BP: 122/72   Patient Position: Sitting   Pulse: 60   SpO2: 99%   Weight: 50 kg (110 lb 3.7 oz)   Height: 4' 9" (1.448 m)     Physical Exam                This note was generated with the assistance of ambient listening technology. Verbal consent was obtained by the patient and accompanying visitor(s) for the recording of patient appointment to facilitate this note. I attest to having reviewed and edited the generated note for accuracy, though some syntax or spelling errors may persist. Please contact the author of this note for any clarification.  Parts of the note were also generated with voice recognition software.  Occasionally this software will misinterpreted words or phrases    "

## 2025-07-30 ENCOUNTER — OFFICE VISIT (OUTPATIENT)
Dept: PRIMARY CARE CLINIC | Facility: CLINIC | Age: 80
End: 2025-07-30
Payer: MEDICARE

## 2025-07-30 VITALS
OXYGEN SATURATION: 98 % | HEART RATE: 60 BPM | SYSTOLIC BLOOD PRESSURE: 118 MMHG | DIASTOLIC BLOOD PRESSURE: 62 MMHG | HEIGHT: 57 IN | BODY MASS INDEX: 24.61 KG/M2 | WEIGHT: 114.06 LBS

## 2025-07-30 DIAGNOSIS — E78.01 FAMILIAL HYPERCHOLESTEROLEMIA: ICD-10-CM

## 2025-07-30 DIAGNOSIS — G47.00 INSOMNIA, UNSPECIFIED TYPE: ICD-10-CM

## 2025-07-30 DIAGNOSIS — E03.9 HYPOTHYROIDISM, UNSPECIFIED TYPE: ICD-10-CM

## 2025-07-30 DIAGNOSIS — I25.10 CORONARY ARTERY DISEASE INVOLVING NATIVE CORONARY ARTERY OF NATIVE HEART WITHOUT ANGINA PECTORIS: Primary | ICD-10-CM

## 2025-07-30 DIAGNOSIS — I10 ESSENTIAL HYPERTENSION: ICD-10-CM

## 2025-07-30 PROCEDURE — 1159F MED LIST DOCD IN RCRD: CPT | Mod: CPTII,HCNC,S$GLB, | Performed by: FAMILY MEDICINE

## 2025-07-30 PROCEDURE — 99214 OFFICE O/P EST MOD 30 MIN: CPT | Mod: 51,HCNC,S$GLB, | Performed by: FAMILY MEDICINE

## 2025-07-30 PROCEDURE — 99999 PR PBB SHADOW E&M-EST. PATIENT-LVL V: CPT | Mod: PBBFAC,HCNC,, | Performed by: FAMILY MEDICINE

## 2025-07-30 PROCEDURE — 1170F FXNL STATUS ASSESSED: CPT | Mod: CPTII,HCNC,S$GLB, | Performed by: FAMILY MEDICINE

## 2025-07-30 PROCEDURE — 1160F RVW MEDS BY RX/DR IN RCRD: CPT | Mod: CPTII,HCNC,S$GLB, | Performed by: FAMILY MEDICINE

## 2025-07-30 PROCEDURE — 3074F SYST BP LT 130 MM HG: CPT | Mod: CPTII,HCNC,S$GLB, | Performed by: FAMILY MEDICINE

## 2025-07-30 PROCEDURE — 3078F DIAST BP <80 MM HG: CPT | Mod: CPTII,HCNC,S$GLB, | Performed by: FAMILY MEDICINE

## 2025-07-30 PROCEDURE — G2211 COMPLEX E/M VISIT ADD ON: HCPCS | Mod: HCNC,S$GLB,, | Performed by: FAMILY MEDICINE

## 2025-07-30 PROCEDURE — 1126F AMNT PAIN NOTED NONE PRSNT: CPT | Mod: CPTII,HCNC,S$GLB, | Performed by: FAMILY MEDICINE

## 2025-07-30 RX ORDER — ONDANSETRON 4 MG/1
TABLET, ORALLY DISINTEGRATING ORAL
COMMUNITY

## 2025-07-30 RX ORDER — ATORVASTATIN CALCIUM 80 MG/1
TABLET, FILM COATED ORAL
COMMUNITY

## 2025-07-30 NOTE — PROGRESS NOTES
Primary Care Provider Appointment   [facility]       Patient ID: Medina Hameed is a 79 y.o. female.    ASSESSMENT/PLAN by Problem List:    Assessment & Plan    IMPRESSION:  - CAD stable; continued atorvastatin and Repatha for lipid control.  - HTN stable and well-controlled.  - Familial HLD managed with current medication regimen.  - Hypothyroidism stable on current levothyroxine dose.  - Discussed long-term alprazolam use for insomnia with patient unwilling to reduce dosage at this time, considering risks including potential increased risk of dementia.  - Attributed lower extremity edema to venous insufficiency, possibly exacerbated by amlodipine.    ## ATHEROSCLEROTIC HEART DISEASE:  - Medina's condition is stable with no angina despite limited activity.  - Lipids are well-controlled.  - Continue atorvastatin 80 mg daily, Repatha, and aspirin 81 mg daily.  - Medina to maintain follow-up with cardiology.    ## HYPERTENSION:  - Hypertension remains stable and well-controlled.  - Continue amlodipine and Dyazide (triamterene/HCTZ) daily.  - Cautioned patient against taking Dyazide during hot beach days due to risk of dehydration.    ## HYPERLIPIDEMIA:  - Lipid levels have improved with current medication regimen.  - Continue atorvastatin and Repatha.  - Ordered lipid panel in 3 months to reassess treatment efficacy.    ## HYPOTHYROIDISM:  - Condition remains stable on levothyroxine 88 mcg daily.  - Continue current medication.    ## INSOMNIA:  - Medina uses alprazolam for insomnia management.  - Previous trial of trazodone was unsuccessful.  - Continue alprazolam for now, but will discuss dose reduction strategies in future visits.    ## LOCALIZED EDEMA:  - Medina reports swelling in feet, especially on vacation.  - Examination showed mild, non-alarming swelling with intact pulses.  - Explained venous insufficiency and its relation to edema.  - Advised reducing salt intake,  especially when eating out or on vacation.  - Recommend elevating feet during rest periods and wearing compression stockings when swelling occurs.  - Medina to discuss with cardiologist if swelling worsens.    ## LONG-TERM ASPIRIN USE:  - Continue 81 mg aspirin daily as part of cardiovascular protection regimen.    ## GENERAL RECOMMENDATIONS:  - Medina to stay hydrated, especially in hot weather and during beach vacations.           ASSOCIATED ORDERS:  1. Coronary artery disease involving native coronary artery of native heart without angina pectoris  Overview:  She reports history of coronary artery disease, status post stent placement.  She follows closely with Dr. Harry Madrigal.  She denies any angina or acute symptoms.  She reports she had an angiogram in 2023, summer, without any significant blockages or intervention required.  She continues to follow with Cardiology      2. Essential hypertension    3. Familial hypercholesterolemia    4. Hypothyroidism, unspecified type    5. Insomnia, unspecified type  Overview:  Patient reports a longstanding history of insomnia for which she takes alprazolam 0.5 mg.  She apparently had been on this for many years before I met her.  I did encourage her to reduce the dosage last year and she is reduce to half a tablet as needed.  She is currently stable at this dosage so will continue and monitor             Follow Up:  Three months      Subjective:       HPI    Patient is a/an 79 y.o.  female     For complete problem list, past medical history, surgical history, social history, etc., see appropriate section in the electronic medical record    History of Present Illness    CHIEF COMPLAINT:  Medina presents today for follow-up    CARDIOVASCULAR:  She reports stable coronary artery disease with no current anginal symptoms and acknowledges being fairly inactive but experiencing no chest pain. Her hypertension is currently stable and well controlled. Her familial hyperlipidemia has  improved with current medication regimen and lipids are controlled. She continues to follow with cardiology for ongoing management and reports good medication adherence.    HYPOTHYROIDISM:  She reports stable hypothyroidism while taking levothyroxine 88 mcg daily. She denies any current symptoms of thyroid dysfunction with no reported side effects or concerns.    INSOMNIA:  She reports long-standing insomnia with disrupted sleep pattern. She describes falling asleep initially, then awakening 1-2 hours later and feeling wide awake. To manage this, she uses computer screen exposure to help her fall back asleep. She has been on long-term alprazolam and has been dependent on the medication for many years, demonstrating resistance to dosage reduction. She continues to use alprazolam as a sleep aid.    FOOT SWELLING:  She reports significant bilateral foot swelling, particularly during recent vacation. She describes feet as severely swollen to the point where she could not put on her shoes. Swelling was most pronounced while sitting on the beach. She admits to not taking her diuretic medication during vacation. She notes difficulty urinating and not sweating during vacation. Currently experiencing ongoing, but less severe foot swelling. She denies consistent daily swelling, indicating this is an intermittent issue.    CURRENT MEDICATIONS:  She is currently taking atorvastatin 80 mg daily, Repatha, aspirin 81 mg daily, levothyroxine 88 mcg daily, and alprazolam for sleep.      ROS:  Cardiovascular: +lower extremity edema  Genitourinary: +difficulty urinating  Neurological: +difficulty staying asleep, +sleep disturbances  Psychiatric: +sleep difficulty       Review of Systems   Respiratory: Negative.  Negative for shortness of breath and wheezing.    Cardiovascular: Negative.  Negative for chest pain and leg swelling.   Psychiatric/Behavioral:  Negative for dysphoric mood.        Objective     Physical Exam  Vitals  "reviewed.   Constitutional:       General: She is not in acute distress.     Appearance: Normal appearance. She is well-developed. She is not ill-appearing or toxic-appearing.   HENT:      Head: Normocephalic and atraumatic.   Eyes:      General: No scleral icterus.  Cardiovascular:      Rate and Rhythm: Normal rate and regular rhythm.      Heart sounds: Normal heart sounds.   Pulmonary:      Effort: Pulmonary effort is normal. No respiratory distress.      Breath sounds: No wheezing or rales.   Neurological:      Mental Status: She is alert and oriented to person, place, and time.   Psychiatric:         Mood and Affect: Mood normal.         Behavior: Behavior normal.       Vitals:    07/30/25 1418   BP: 118/62   Patient Position: Sitting   Pulse: 60   SpO2: 98%   Weight: 51.8 kg (114 lb 1.4 oz)   Height: 4' 9" (1.448 m)     Physical Exam                    This note was generated with the assistance of ambient listening technology. Verbal consent was obtained by the patient and accompanying visitor(s) for the recording of patient appointment to facilitate this note. I attest to having reviewed and edited the generated note for accuracy, though some syntax or spelling errors may persist. Please contact the author of this note for any clarification.  Parts of the note were also generated with voice recognition software.  Occasionally this software will misinterpreted words or phrases    "